# Patient Record
Sex: MALE | Race: WHITE
[De-identification: names, ages, dates, MRNs, and addresses within clinical notes are randomized per-mention and may not be internally consistent; named-entity substitution may affect disease eponyms.]

---

## 2020-08-02 ENCOUNTER — HOSPITAL ENCOUNTER (EMERGENCY)
Dept: HOSPITAL 62 - ER | Age: 80
Discharge: HOME | End: 2020-08-02
Payer: MEDICARE

## 2020-08-02 VITALS — DIASTOLIC BLOOD PRESSURE: 71 MMHG | SYSTOLIC BLOOD PRESSURE: 135 MMHG

## 2020-08-02 DIAGNOSIS — R06.02: ICD-10-CM

## 2020-08-02 DIAGNOSIS — R50.9: Primary | ICD-10-CM

## 2020-08-02 DIAGNOSIS — I10: ICD-10-CM

## 2020-08-02 DIAGNOSIS — R53.1: ICD-10-CM

## 2020-08-02 DIAGNOSIS — R42: ICD-10-CM

## 2020-08-02 DIAGNOSIS — Z20.828: ICD-10-CM

## 2020-08-02 DIAGNOSIS — R53.83: ICD-10-CM

## 2020-08-02 LAB
A TYPE INFLUENZA AG: NEGATIVE
ABSOLUTE LYMPHOCYTES# (MANUAL): 1.1 10^3/UL (ref 0.5–4.7)
ABSOLUTE MONOCYTES # (MANUAL): 1.8 10^3/UL (ref 0.1–1.4)
ADD MANUAL DIFF: YES
ALBUMIN SERPL-MCNC: 4.7 G/DL (ref 3.5–5)
ALP SERPL-CCNC: 60 U/L (ref 38–126)
ANION GAP SERPL CALC-SCNC: 8 MMOL/L (ref 5–19)
APPEARANCE UR: (no result)
APTT PPP: (no result) S
AST SERPL-CCNC: 33 U/L (ref 17–59)
B INFLUENZA AG: NEGATIVE
BASOPHILS NFR BLD MANUAL: 0 % (ref 0–2)
BILIRUB DIRECT SERPL-MCNC: 0.1 MG/DL (ref 0–0.4)
BILIRUB SERPL-MCNC: 1.1 MG/DL (ref 0.2–1.3)
BILIRUB UR QL STRIP: NEGATIVE
BUN SERPL-MCNC: 20 MG/DL (ref 7–20)
CALCIUM: 9.7 MG/DL (ref 8.4–10.2)
CHLORIDE SERPL-SCNC: 99 MMOL/L (ref 98–107)
CO2 SERPL-SCNC: 28 MMOL/L (ref 22–30)
EOSINOPHIL NFR BLD MANUAL: 0 % (ref 0–6)
ERYTHROCYTE [DISTWIDTH] IN BLOOD BY AUTOMATED COUNT: 13.5 % (ref 11.5–14)
GLUCOSE SERPL-MCNC: 116 MG/DL (ref 75–110)
GLUCOSE UR STRIP-MCNC: NEGATIVE MG/DL
HCT VFR BLD CALC: 48.2 % (ref 37.9–51)
HGB BLD-MCNC: 16.9 G/DL (ref 13.5–17)
INR PPP: 0.97
KETONES UR STRIP-MCNC: (no result) MG/DL
MCH RBC QN AUTO: 31.7 PG (ref 27–33.4)
MCHC RBC AUTO-ENTMCNC: 35.1 G/DL (ref 32–36)
MCV RBC AUTO: 90 FL (ref 80–97)
MONOCYTES % (MANUAL): 12 % (ref 3–13)
PH UR STRIP: 5 [PH] (ref 5–9)
PLATELET # BLD: 164 10^3/UL (ref 150–450)
PLATELET COMMENT: ADEQUATE
POTASSIUM SERPL-SCNC: 4.4 MMOL/L (ref 3.6–5)
PROT SERPL-MCNC: 8 G/DL (ref 6.3–8.2)
PROT UR STRIP-MCNC: 30 MG/DL
PROTHROMBIN TIME: 13.1 SEC (ref 11.4–15.4)
RBC # BLD AUTO: 5.33 10^6/UL (ref 4.35–5.55)
RBC MORPH BLD: (no result)
SEGMENTED NEUTROPHILS % (MAN): 81 % (ref 42–78)
SP GR UR STRIP: 1.03
TOTAL CELLS COUNTED BLD: 100
UROBILINOGEN UR-MCNC: 2 MG/DL (ref ?–2)
VARIANT LYMPHS NFR BLD MANUAL: 7 % (ref 13–45)
WBC # BLD AUTO: 15 10^3/UL (ref 4–10.5)

## 2020-08-02 PROCEDURE — 71045 X-RAY EXAM CHEST 1 VIEW: CPT

## 2020-08-02 PROCEDURE — 85610 PROTHROMBIN TIME: CPT

## 2020-08-02 PROCEDURE — 87040 BLOOD CULTURE FOR BACTERIA: CPT

## 2020-08-02 PROCEDURE — 83605 ASSAY OF LACTIC ACID: CPT

## 2020-08-02 PROCEDURE — 36415 COLL VENOUS BLD VENIPUNCTURE: CPT

## 2020-08-02 PROCEDURE — 84484 ASSAY OF TROPONIN QUANT: CPT

## 2020-08-02 PROCEDURE — 80053 COMPREHEN METABOLIC PANEL: CPT

## 2020-08-02 PROCEDURE — 96360 HYDRATION IV INFUSION INIT: CPT

## 2020-08-02 PROCEDURE — 99284 EMERGENCY DEPT VISIT MOD MDM: CPT

## 2020-08-02 PROCEDURE — 93005 ELECTROCARDIOGRAM TRACING: CPT

## 2020-08-02 PROCEDURE — 93010 ELECTROCARDIOGRAM REPORT: CPT

## 2020-08-02 PROCEDURE — 87804 INFLUENZA ASSAY W/OPTIC: CPT

## 2020-08-02 PROCEDURE — 85025 COMPLETE CBC W/AUTO DIFF WBC: CPT

## 2020-08-02 PROCEDURE — C9803 HOPD COVID-19 SPEC COLLECT: HCPCS

## 2020-08-02 PROCEDURE — 87635 SARS-COV-2 COVID-19 AMP PRB: CPT

## 2020-08-02 PROCEDURE — 81001 URINALYSIS AUTO W/SCOPE: CPT

## 2020-08-02 NOTE — ER DOCUMENT REPORT
ED Fever





- General


Chief Complaint: Fever


Stated Complaint: WEAKNESS


Time Seen by Provider: 08/02/20 14:02


Mode of Arrival: Wheelchair


Information source: Patient


Notes: 





Patient is a 79-year-old male comes emergency room complaining of fatigue.  

Patient states he started feeling bad approximately 3 weeks ago he had talked 

his primary care provider that he had had increasing fatigue along with mild 

dizziness.  He states that the doctor told him that it could be his blood 

pressure medication causing it.  Patient states is gotten worse over the last 

few days.  He states he is noticed these not being on the walk on his treadmill 

and he thinks is more from being tired than being short of breath but has a 

little shortness of breath.  Patient denies having any fever he did not know 

until he arrived in the emergency room today had a temp of 102.0.  He only has a

past medical history pertinent for hypertension and hypothyroidism.  He denies 

any history of cardiac disease.  Patient does not smoke.  He lives at home with 

a member of the [employer] that helps take care of him.  He has had no sick 

contacts.  He has had no contact with anyone he knows of that has had the 

coronavirus.


TRAVEL OUTSIDE OF THE [place] IN LAST 30 DAYS: No





- HPI


Onset: Other - 3 weeks worse past 3 days


Onset/Duration: Gradual


Quality of pain: Achy


Severity: None


Pain Level: Denies


Context: denies: Cough


Associated symptoms: Fever, Weakness


Similar symptoms previously: Yes


Recently seen / treated by doctor: Yes





- Related Data


Allergies/Adverse Reactions: 


                                        





No Known Allergies Allergy (Verified 08/02/20 12:59)


   











Past Medical History





- General


Information source: Patient





- Social History


Smoking Status: Never Smoker


Chew tobacco use (# tins/day): No


Frequency of alcohol use: None


Drug Abuse: None


Lives with: Friend


Family History: Reviewed & Not Pertinent





- Past Medical History


Cardiac Medical History: Reports: Hx Hypercholesterolemia, Hx Hypertension





Review of Systems





- Review of Systems


Constitutional: See HPI, Fever, Weakness


EENT: No symptoms reported


Cardiovascular: No symptoms reported


Respiratory: No symptoms reported


Gastrointestinal: No symptoms reported


Genitourinary: No symptoms reported


Male Genitourinary: No symptoms reported


Musculoskeletal: No symptoms reported


Skin: No symptoms reported


Hematologic/Lymphatic: No symptoms reported


Neurological/Psychological: No symptoms reported


-: Yes All other systems reviewed and negative





Physical Exam





- Vital signs


Vitals: 


                                        











Temp Pulse Resp BP Pulse Ox


 


 102.9 F H  89   16   142/71 H  98 


 


 08/02/20 11:58  08/02/20 11:58  08/02/20 11:58  08/02/20 11:58  08/02/20 11:58











Interpretation: Hypotensive





- Notes


Notes: 





PHYSICAL EXAMINATION:





GENERAL: Patient is a frail-appearing 79-year-old male who is in no apparent 

distress on examination this afternoon.  Patient answers questions appropriately

he just appears  tired.





HEAD: Atraumatic, normocephalic.





EYES: Pupils equal round and reactive to light, extraocular movements intact, 

sclera anicteric, conjunctiva are normal.





ENT: Examination head and upper airway showed nasal mucosa be mildly 

erythematous and edematous no rhinorrhea noted.  No frontal or maxillary sinus 

tenderness to palpation.  Bilateral TMs normal no retraction or bulging.  

Posterior pharynx shows some mild erythema but no exudates uvula is midline 

erythema no exudate and airways patent.





NECK: Normal range of motion, supple without lymphadenopathy





LUNGS: Auscultation patient's lungs show bilateral breath sounds with breath 

sounds decreased throughout but no rhonchi rales or wheeze are heard on 

auscultation.


HEART: Regular rate and rhythm without murmurs





ABDOMEN: Soft, nontender, nondistended abdomen.  No guarding, no rebound.  No 

masses appreciated.





Musculoskeletal: Normal range of motion, no pitting or edema.  No cyanosis.





NEUROLOGICAL:   Normal speech, normal gait.  Normal sensory, motor exams 





PSYCH: Normal mood, normal affect.





SKIN: Warm, Dry, normal turgor, no rashes or lesions noted.





Course





- Re-evaluation


Re-evalutation: 





08/02/20 16:15


Original concern for probable patient was due to be admitted however we 

ambulated patient and his saturations never dropped below 96.  We gave him a 

half a liter of fluids and he perked up.  And he feels like he is ready to home.

 Given that his labs have all come back relatively normal with the exception of 

a white count of 15,000 I am going to go ahead and send him home on some 

Zithromax.  You know his chest x-ray was negative for any acute findings I feel 

at 71 years old justifiable that he if this is an early pneumonia and has not 

been picked up at this time that the Zithromax may actually improve his outcome.

 I have discussed with the patient in depth that he needs to go home self 

quarantine until such time as he gets a negative test result.  Of also informed 

him since he does have a houseguest that they will need to be wearing mask and 

social distance must be observed.  Have highly recommended him that he get a 

thermometer if he does not have 1 and alternate Tylenol and Motrin every 4 hours

to keep his fever down.  I have also highly explained to him the need to keep 

hydrated.  He also understands that should he not be able to control the fever 

or he gets short of breath he is to return to ER for reevaluation.





- Vital Signs


Vital signs: 


                                        











Temp Pulse Resp BP Pulse Ox


 


 99.1 F   89   32 H  146/74 H  95 


 


 08/02/20 14:37  08/02/20 11:58  08/02/20 12:14  08/02/20 12:13  08/02/20 12:22














- Laboratory


Result Diagrams: 


                                 08/02/20 12:05





                                 08/02/20 12:05


Laboratory results interpreted by me: 


                                        











  08/02/20 08/02/20 08/02/20





  12:05 12:05 12:05


 


WBC  15.0 H  


 


Seg Neuts % (Manual)  81 H  


 


Lymphocytes % (Manual)  7 L  


 


Abs Neuts (Manual)  12.2 H  


 


Abs Monocytes (Manual)  1.8 H  


 


Sodium   135.3 L 


 


Glucose   116 H 


 


Urine Protein    30 H


 


Urine Ketones    TRACE H


 


Urine Urobilinogen    2.0 H


 


Urine Ascorbic Acid    40 H














Discharge





- Discharge


Clinical Impression: 


Fever


Qualifiers:


 Fever type: unspecified Qualified Code(s): R50.9 - Fever, unspecified





Condition: Stable


Disposition: HOME, SELF-CARE


Instructions:  Acetaminophen, Viral Syndrome ([facility]), Ibuprofen (General) ([facility])


Additional Instructions: 


As we discussed is highly important at this time that you go home and treat 

yourself as if you have the coronavirus.  The reason is you have spiked a fever 

with no known cause.  So the best thing for you and people surrounding you is to

stay  and to wear a mask.  Since you live with someone in your house I 

highly recommend that both of you wear a mask at all times and that you keep 

social distancing norms of at least 6 foot.  You should alternate Tylenol and 

Motrin about every 4 hours to keep your fever down.  You should drink fluids to 

keep yourself hydrated to include water juice tea Gatorade.  And you should 

contact your primary care provider tomorrow for her to set up an appointment for

continuation of care.  Should you spike a fever or have any concerns or should 

get short of breath return to ER for reevaluation.


Prescriptions: 


Azithromycin [Zithromax 250 mg Tablet] 250 mg PO ASDIR PRN #6 tablet


 PRN Reason: 


Referrals: 


[facility] [Provider Group] - Follow up as needed

## 2020-08-02 NOTE — RADIOLOGY REPORT (SQ)
EXAM DESCRIPTION:  CHEST SINGLE VIEW



IMAGES COMPLETED DATE/TIME:  8/2/2020 12:40 pm



REASON FOR STUDY:  weakness



COMPARISON:  None.



EXAM PARAMETERS:  NUMBER OF VIEWS: One view.

TECHNIQUE: Single frontal radiographic view of the chest acquired.

RADIATION DOSE: NA

LIMITATIONS: None.



FINDINGS:  LUNGS AND PLEURA: No opacities, masses or pneumothorax. No pleural effusion.

MEDIASTINUM AND HILAR STRUCTURES: No masses.  Contour normal.

HEART AND VASCULAR STRUCTURES: Heart normal in size.  Normal vasculature.

BONES: No acute findings.

HARDWARE: None in the chest.

OTHER: No other significant finding.



IMPRESSION:  NO ACUTE RADIOGRAPHIC FINDING IN THE CHEST.



TECHNICAL DOCUMENTATION:  JOB ID:  [id number]

 2011 [employer]- All Rights Reserved



Reading location - IP/workstation name: [email or web address]

## 2020-08-04 ENCOUNTER — HOSPITAL ENCOUNTER (OUTPATIENT)
Dept: HOSPITAL 62 - ER | Age: 80
Setting detail: OBSERVATION
LOS: 2 days | Discharge: HOME | End: 2020-08-06
Attending: NURSE PRACTITIONER | Admitting: FAMILY MEDICINE
Payer: MEDICARE

## 2020-08-04 DIAGNOSIS — R73.9: ICD-10-CM

## 2020-08-04 DIAGNOSIS — I10: ICD-10-CM

## 2020-08-04 DIAGNOSIS — R79.89: ICD-10-CM

## 2020-08-04 DIAGNOSIS — R53.1: Primary | ICD-10-CM

## 2020-08-04 DIAGNOSIS — E78.5: ICD-10-CM

## 2020-08-04 DIAGNOSIS — E03.9: ICD-10-CM

## 2020-08-04 DIAGNOSIS — E87.1: ICD-10-CM

## 2020-08-04 DIAGNOSIS — Z88.4: ICD-10-CM

## 2020-08-04 DIAGNOSIS — Z87.891: ICD-10-CM

## 2020-08-04 DIAGNOSIS — Z79.899: ICD-10-CM

## 2020-08-04 DIAGNOSIS — D69.6: ICD-10-CM

## 2020-08-04 LAB
ADD MANUAL DIFF: NO
ALBUMIN SERPL-MCNC: 3.3 G/DL (ref 3.5–5)
ALP SERPL-CCNC: 69 U/L (ref 38–126)
ANION GAP SERPL CALC-SCNC: 6 MMOL/L (ref 5–19)
APPEARANCE UR: (no result)
APTT PPP: YELLOW S
AST SERPL-CCNC: 90 U/L (ref 17–59)
BASOPHILS # BLD AUTO: 0 10^3/UL (ref 0–0.2)
BASOPHILS NFR BLD AUTO: 0.5 % (ref 0–2)
BILIRUB DIRECT SERPL-MCNC: 0 MG/DL (ref 0–0.4)
BILIRUB SERPL-MCNC: 0.5 MG/DL (ref 0.2–1.3)
BILIRUB UR QL STRIP: NEGATIVE
BUN SERPL-MCNC: 18 MG/DL (ref 7–20)
CALCIUM: 8.4 MG/DL (ref 8.4–10.2)
CHLORIDE SERPL-SCNC: 103 MMOL/L (ref 98–107)
CK MB SERPL-MCNC: 1.86 NG/ML (ref ?–4.55)
CK SERPL-CCNC: 249 U/L (ref 55–170)
CO2 SERPL-SCNC: 27 MMOL/L (ref 22–30)
EOSINOPHIL # BLD AUTO: 0.2 10^3/UL (ref 0–0.6)
EOSINOPHIL NFR BLD AUTO: 2.3 % (ref 0–6)
ERYTHROCYTE [DISTWIDTH] IN BLOOD BY AUTOMATED COUNT: 13.9 % (ref 11.5–14)
GLUCOSE SERPL-MCNC: 96 MG/DL (ref 75–110)
GLUCOSE UR STRIP-MCNC: NEGATIVE MG/DL
HCT VFR BLD CALC: 43.6 % (ref 37.9–51)
HGB BLD-MCNC: 15.2 G/DL (ref 13.5–17)
INR PPP: 0.99
KETONES UR STRIP-MCNC: NEGATIVE MG/DL
LYMPHOCYTES # BLD AUTO: 0.7 10^3/UL (ref 0.5–4.7)
LYMPHOCYTES NFR BLD AUTO: 9.6 % (ref 13–45)
MCH RBC QN AUTO: 31.3 PG (ref 27–33.4)
MCHC RBC AUTO-ENTMCNC: 35 G/DL (ref 32–36)
MCV RBC AUTO: 90 FL (ref 80–97)
MONOCYTES # BLD AUTO: 0.5 10^3/UL (ref 0.1–1.4)
MONOCYTES NFR BLD AUTO: 7.6 % (ref 3–13)
NEUTROPHILS # BLD AUTO: 5.6 10^3/UL (ref 1.7–8.2)
NEUTS SEG NFR BLD AUTO: 80 % (ref 42–78)
NITRITE UR QL STRIP: NEGATIVE
NT PRO BNP: 746 PG/ML (ref ?–450)
PH UR STRIP: 5 [PH] (ref 5–9)
PLATELET # BLD: 128 10^3/UL (ref 150–450)
POTASSIUM SERPL-SCNC: 3.8 MMOL/L (ref 3.6–5)
PROT SERPL-MCNC: 6.1 G/DL (ref 6.3–8.2)
PROT UR STRIP-MCNC: 100 MG/DL
PROTHROMBIN TIME: 13.3 SEC (ref 11.4–15.4)
RBC # BLD AUTO: 4.86 10^6/UL (ref 4.35–5.55)
SP GR UR STRIP: 1.02
TOTAL CELLS COUNTED % (AUTO): 100 %
TROPONIN I SERPL-MCNC: 0.04 NG/ML
UROBILINOGEN UR-MCNC: NEGATIVE MG/DL (ref ?–2)
WBC # BLD AUTO: 7 10^3/UL (ref 4–10.5)

## 2020-08-04 PROCEDURE — 84443 ASSAY THYROID STIM HORMONE: CPT

## 2020-08-04 PROCEDURE — 99285 EMERGENCY DEPT VISIT HI MDM: CPT

## 2020-08-04 PROCEDURE — 83735 ASSAY OF MAGNESIUM: CPT

## 2020-08-04 PROCEDURE — 36415 COLL VENOUS BLD VENIPUNCTURE: CPT

## 2020-08-04 PROCEDURE — 82553 CREATINE MB FRACTION: CPT

## 2020-08-04 PROCEDURE — 85610 PROTHROMBIN TIME: CPT

## 2020-08-04 PROCEDURE — 85027 COMPLETE CBC AUTOMATED: CPT

## 2020-08-04 PROCEDURE — 80061 LIPID PANEL: CPT

## 2020-08-04 PROCEDURE — 96360 HYDRATION IV INFUSION INIT: CPT

## 2020-08-04 PROCEDURE — 83880 ASSAY OF NATRIURETIC PEPTIDE: CPT

## 2020-08-04 PROCEDURE — G0378 HOSPITAL OBSERVATION PER HR: HCPCS

## 2020-08-04 PROCEDURE — 84484 ASSAY OF TROPONIN QUANT: CPT

## 2020-08-04 PROCEDURE — 85025 COMPLETE CBC W/AUTO DIFF WBC: CPT

## 2020-08-04 PROCEDURE — 82550 ASSAY OF CK (CPK): CPT

## 2020-08-04 PROCEDURE — 71045 X-RAY EXAM CHEST 1 VIEW: CPT

## 2020-08-04 PROCEDURE — 81001 URINALYSIS AUTO W/SCOPE: CPT

## 2020-08-04 PROCEDURE — 87086 URINE CULTURE/COLONY COUNT: CPT

## 2020-08-04 PROCEDURE — 80076 HEPATIC FUNCTION PANEL: CPT

## 2020-08-04 PROCEDURE — 80053 COMPREHEN METABOLIC PANEL: CPT

## 2020-08-04 PROCEDURE — 84481 FREE ASSAY (FT-3): CPT

## 2020-08-04 PROCEDURE — 80048 BASIC METABOLIC PNL TOTAL CA: CPT

## 2020-08-04 PROCEDURE — 93306 TTE W/DOPPLER COMPLETE: CPT

## 2020-08-04 PROCEDURE — 93017 CV STRESS TEST TRACING ONLY: CPT

## 2020-08-04 NOTE — RADIOLOGY REPORT (SQ)
EXAM DESCRIPTION:  Chest x-ray



Completed date and time 8/2/2020 12:40 PM



CLINICAL HISTORY:  79 years  Male  Fatigue



COMPARISON:  8/2/2020



FINDINGS: 



The cardiomediastinal silhouette appears unremarkable.

No consolidating infiltrates or pleural effusions.

No pneumothorax. 







IMPRESSION: 



No acute abnormality is identified.

## 2020-08-05 LAB
ALBUMIN SERPL-MCNC: 2.6 G/DL (ref 3.5–5)
ALP SERPL-CCNC: 56 U/L (ref 38–126)
ANION GAP SERPL CALC-SCNC: 7 MMOL/L (ref 5–19)
AST SERPL-CCNC: 75 U/L (ref 17–59)
BILIRUB DIRECT SERPL-MCNC: 0 MG/DL (ref 0–0.4)
BILIRUB SERPL-MCNC: 0.4 MG/DL (ref 0.2–1.3)
BUN SERPL-MCNC: 13 MG/DL (ref 7–20)
CALCIUM: 7.8 MG/DL (ref 8.4–10.2)
CHLORIDE SERPL-SCNC: 103 MMOL/L (ref 98–107)
CHOLEST SERPL-MCNC: 97.76 MG/DL (ref 0–200)
CK MB SERPL-MCNC: 1.08 NG/ML (ref ?–4.55)
CK MB SERPL-MCNC: 1.12 NG/ML (ref ?–4.55)
CK MB SERPL-MCNC: 1.67 NG/ML (ref ?–4.55)
CK SERPL-CCNC: 168 U/L (ref 55–170)
CO2 SERPL-SCNC: 24 MMOL/L (ref 22–30)
ERYTHROCYTE [DISTWIDTH] IN BLOOD BY AUTOMATED COUNT: 13.9 % (ref 11.5–14)
GLUCOSE SERPL-MCNC: 135 MG/DL (ref 75–110)
HCT VFR BLD CALC: 38.9 % (ref 37.9–51)
HGB BLD-MCNC: 13.6 G/DL (ref 13.5–17)
LDLC SERPL DIRECT ASSAY-MCNC: < 30 MG/DL (ref ?–100)
MCH RBC QN AUTO: 31.3 PG (ref 27–33.4)
MCHC RBC AUTO-ENTMCNC: 35 G/DL (ref 32–36)
MCV RBC AUTO: 89 FL (ref 80–97)
PLATELET # BLD: 133 10^3/UL (ref 150–450)
POTASSIUM SERPL-SCNC: 3.7 MMOL/L (ref 3.6–5)
PROT SERPL-MCNC: 5.3 G/DL (ref 6.3–8.2)
RBC # BLD AUTO: 4.35 10^6/UL (ref 4.35–5.55)
T3FREE SERPL-MCNC: 2.42 PG/ML (ref 2.77–5.27)
TRIGL SERPL-MCNC: 311 MG/DL (ref ?–150)
TROPONIN I SERPL-MCNC: 0.06 NG/ML
TSH SERPL-ACNC: 2.49 UIU/ML (ref 0.47–4.68)
VLDLC SERPL CALC-MCNC: 62.2 MG/DL (ref 10–31)
WBC # BLD AUTO: 5.1 10^3/UL (ref 4–10.5)

## 2020-08-05 RX ADMIN — Medication SCH: at 21:58

## 2020-08-05 RX ADMIN — FAMOTIDINE SCH: 20 TABLET, FILM COATED ORAL at 21:58

## 2020-08-05 RX ADMIN — HEPARIN SODIUM SCH: 5000 INJECTION, SOLUTION INTRAVENOUS; SUBCUTANEOUS at 06:32

## 2020-08-05 RX ADMIN — FAMOTIDINE SCH: 20 TABLET, FILM COATED ORAL at 10:36

## 2020-08-05 RX ADMIN — HEPARIN SODIUM SCH: 5000 INJECTION, SOLUTION INTRAVENOUS; SUBCUTANEOUS at 21:58

## 2020-08-05 RX ADMIN — HEPARIN SODIUM SCH: 5000 INJECTION, SOLUTION INTRAVENOUS; SUBCUTANEOUS at 13:51

## 2020-08-05 RX ADMIN — Medication SCH ML: at 06:24

## 2020-08-05 RX ADMIN — METOPROLOL SUCCINATE SCH MG: 50 TABLET, EXTENDED RELEASE ORAL at 10:32

## 2020-08-05 RX ADMIN — Medication SCH: at 13:35

## 2020-08-05 RX ADMIN — DOCUSATE SODIUM SCH: 100 CAPSULE, LIQUID FILLED ORAL at 17:09

## 2020-08-05 RX ADMIN — DOCUSATE SODIUM SCH: 100 CAPSULE, LIQUID FILLED ORAL at 10:36

## 2020-08-05 NOTE — XCELERA REPORT
[address] [address]

                               Tel: [phone]

                               Fax: [phone]



                      Transthoracic Echocardiogram Report

_______________________________________________________________________________



Name: LUCIANO JACKSON

MRN: [id number]                           Age: 79 yrs

Gender: Male                              : 1940

Patient Status: Inpatient                 Patient Location: [address] [place] [address]

Account #: [id number]

Study Date: 2020 05:59 PM

Accession #: [id number]

_______________________________________________________________________________



Height: 69 in        Weight: 159 lb        BSA: 1.9 m2

_______________________________________________________________________________

Procedure: A complete two-dimensional transthoracic echocardiogram was

performed (2D, M-mode, spectral and color flow Doppler). The study was

technically adequate with some images being suboptimal in quality. Images from

the parasternal window were difficult to obtain and are suboptimal in quality.

Reason For Study: Elevated troponin





Ordering Physician: GUNDERSEN, JAMES

Performed By: Sofia Lu



_______________________________________________________________________________



Interpretation Summary

The left ventricle is grossly normal size.

There is mild concentric left ventricular hypertrophy.

Left ventricular systolic function is normal.

The Ejection Fraction estimate is 65-70%.

Doppler measurements suggest normal left ventricular diastolic function.

The left ventricular wall motion is normal.

Mikd LAE.

Trace MR, trace TR.

No prior studies for comparison.



MMode/2D Measurements & Calculations

RVDd: 3.4 cm      LVIDd: 4.9 cm       FS: 38.2 %         Ao root diam: 2.6 cm

IVSd: 1.1 cm      LVIDs: 3.0 cm       EDV(Teich):        Ao root area:

                                      112.0 ml

                  LVPWd: 1.2 cm                          5.3 cm2

                                      ESV(Teich): 35.6 mlLA dimension: 4.2 cm

                                      EF(Teich): 68.2 %

        _______________________________________________________________

LVOT diam: 2.1 cm LVLd ap4: 7.5 cm    SV(MOD-sp4):

LVOT area:        EDV(MOD-sp4):       47.0 ml

3.3 cm2           67.0 ml

                  LVLs ap4: 5.4 cm

                  ESV(MOD-sp4):

                  20.0 ml

                  EF(MOD-sp4): 70.1 %



Doppler Measurements & Calculations

MV E max francisco:       MV P1/2t max francisco:    Ao V2 max:       AI max francisco:

91.3 cm/sec         99.1 cm/sec          191.4 cm/sec     235.5 cm/sec

MV A max francisco:       MV P1/2t: 98.2 msec  Ao max PG:       AI max P.2 mmHg

86.9 cm/sec                              14.7 mmHg

                    MVA(P1/2t): 2.2 cm2                   AI dec slope:

MV E/A: 1.1         MV dec slope:        GIL(V,D): 2.6 or2843.7 cm/sec2

                    295.6 cm/sec2                         AI P1/2t: 548.7 msec

                    MV dec time: 0.19 sec



        _______________________________________________________________

LV V1 max PG:       SV(LVOT): 90.7 ml    PA V2 max:       TR max francisco:

8.6 mmHg                                 109.6 cm/sec     238.5 cm/sec

LV V1 mean PG:                           PA max PG:       TR max P.8 mmHg

4.0 mmHg                                 4.8 mmHg

LV V1 max:

146.6 cm/sec

LV V1 mean:

84.1 cm/sec

LV V1 VTI: 27.2 cm

        _______________________________________________________________



AV P1/2t-pr_phl:    MV P1/2t-pr_phl:

548.7 msec          98.2 msec



Left Ventricle

The left ventricle is grossly normal size. There is mild concentric left

ventricular hypertrophy. Left ventricular systolic function is normal. The

Ejection Fraction estimate is 65-70%. Doppler measurements suggest normal left

ventricular diastolic function. The left ventricular wall motion is normal.



Right Ventricle

The right ventricle is normal in size, thickness and function. The right

ventricular systolic function is normal.



Atria

The right atrium is normal. The left atrium is mildly dilated.



Mitral Valve

There is mild mitral annular calcification. Mitral valve prolapse cannot be

excluded. There is no mitral valve stenosis. There is a trace amount of mitral

regurgitation.





Aortic Valve

The aortic valve is moderately calcified. There is no aortic valvular

vegetation. There is aortic sclerosis without aortic stenosis. No

hemodynamically significant valvular aortic stenosis. There is a trace to mild

amount of aortic regurgitation.



Tricuspid Valve

The tricuspid valve is not well visualized, but is grossly normal. There is no

tricuspid valve prolapse. There is no tricuspid stenosis. There is a trace or

physiologic amount of tricuspid regurgitation.



Pulmonic Valve

The pulmonic valve is not well seen, but is grossly normal. There is no

vegetation on the pulmonic valve. There is no pulmonic valvular stenosis.

There is no pulmonic valvular regurgitation.



Effusions

There is no pericardial effusion. There is no pleural effusion.





_______________________________________________________________________________

_______________________________________________________________________________



Electronically signed by:      Manuel Donato      on 2020 07:18 PM



CC: GUNDERSEN, JAMES Rodriguez, Antonio

## 2020-08-05 NOTE — PDOC PROGRESS REPORT
Subjective


Progress Note for:: 08/05/20


Subjective:: 


Denies chest pain/chest pressure/shortness of breath.  Complains of continued 

generalized weakness


Reason For Visit: 


ELEVATED TROPONIN,ELEVATED BNP,GENERALIZED WEAKNES








Physical Exam


Vital Signs: 


                                        











Temp Pulse Resp BP Pulse Ox


 


 100.7 F H  77   20   122/68   96 


 


 08/05/20 12:19  08/05/20 12:21  08/05/20 12:19  08/05/20 12:21  08/05/20 12:21








                                 Intake & Output











 08/04/20 08/05/20 08/06/20





 06:59 06:59 06:59


 


Intake Total  1240 240


 


Output Total   150


 


Balance  1240 90


 


Weight  72.4 kg 











General appearance: PRESENT: no acute distress, cooperative, well-developed, 

well-nourished


Head exam: PRESENT: atraumatic, normocephalic


Eye exam: PRESENT: conjunctiva pink


Mouth exam: PRESENT: moist, tongue midline


Neck exam: ABSENT: JVD


Respiratory exam: PRESENT: clear to auscultation rigoberto, symmetrical, unlabored.  

ABSENT: accessory muscle use


Cardiovascular exam: PRESENT: RRR, +S1, +S2


Pulses: PRESENT: normal carotid pulses, normal radial pulses


Vascular exam: PRESENT: normal capillary refill


GI/Abdominal exam: PRESENT: normal bowel sounds, soft.  ABSENT: distended, 

tenderness


Rectal exam: PRESENT: deferred


Extremities exam: ABSENT: pedal edema


Musculoskeletal exam: PRESENT: ambulatory


Neurological exam: PRESENT: alert, awake, oriented to person, oriented to place,

oriented to time, oriented to situation, CN II-XII grossly intact


Psychiatric exam: PRESENT: appropriate affect, normal mood.  ABSENT: agitated, 

anxious


Skin exam: PRESENT: dry, normal color, warm





Results


Laboratory Results: 


                                        





                                 08/05/20 07:30 





                                 08/05/20 07:30 





                                        











  08/04/20 08/04/20 08/04/20





  20:35 20:35 20:35


 


WBC  Cancelled  


 


RBC  Cancelled  


 


Hgb  Cancelled  


 


Hct  Cancelled  


 


MCV  Cancelled  


 


MCH  Cancelled  


 


MCHC  Cancelled  


 


RDW  Cancelled  


 


Plt Count  Cancelled  


 


Seg Neutrophils %  Cancelled  


 


Sodium   Cancelled 


 


Potassium   Cancelled 


 


Chloride   Cancelled 


 


Carbon Dioxide   Cancelled 


 


Anion Gap   Cancelled 


 


BUN   Cancelled 


 


Creatinine   Cancelled 


 


Est GFR ( Amer)   Cancelled 


 


Est GFR (Non-Af Amer)   Cancelled 


 


Glucose   Cancelled 


 


Calcium   Cancelled 


 


Magnesium   


 


Total Bilirubin   Cancelled 


 


AST   Cancelled 


 


Alkaline Phosphatase   Cancelled 


 


Total Protein   Cancelled 


 


Albumin   Cancelled 


 


Triglycerides   


 


Cholesterol   


 


LDL Cholesterol Direct   


 


VLDL Cholesterol   


 


HDL Cholesterol   


 


TSH   


 


Free T3 pg/mL   


 


Urine Color    YELLOW


 


Urine Appearance    SLIGHTLY-CLOUDY


 


Urine pH    5.0


 


Ur Specific Gravity    1.021


 


Urine Protein    100 H


 


Urine Glucose (UA)    NEGATIVE


 


Urine Ketones    NEGATIVE


 


Urine Blood    NEGATIVE


 


Urine Nitrite    NEGATIVE


 


Ur Leukocyte Esterase    NEGATIVE


 


Urine WBC (Auto)    4


 


Urine RBC (Auto)    6














  08/04/20 08/04/20 08/05/20





  22:33 22:33 07:30


 


WBC  7.0   5.1


 


RBC  4.86   4.35


 


Hgb  15.2   13.6


 


Hct  43.6   38.9


 


MCV  90   89


 


MCH  31.3   31.3


 


MCHC  35.0   35.0


 


RDW  13.9   13.9


 


Plt Count  128 L   133 L


 


Seg Neutrophils %  80.0 H  


 


Sodium   135.6 L 


 


Potassium   3.8 


 


Chloride   103 


 


Carbon Dioxide   27 


 


Anion Gap   6 


 


BUN   18 


 


Creatinine   0.82 


 


Est GFR ( Amer)   > 60 


 


Est GFR (Non-Af Amer)   


 


Glucose   96 


 


Calcium   8.4 


 


Magnesium   


 


Total Bilirubin   0.5 


 


AST   90 H 


 


Alkaline Phosphatase   69 


 


Total Protein   6.1 L 


 


Albumin   3.3 L 


 


Triglycerides   


 


Cholesterol   


 


LDL Cholesterol Direct   


 


VLDL Cholesterol   


 


HDL Cholesterol   


 


TSH   


 


Free T3 pg/mL   


 


Urine Color   


 


Urine Appearance   


 


Urine pH   


 


Ur Specific Gravity   


 


Urine Protein   


 


Urine Glucose (UA)   


 


Urine Ketones   


 


Urine Blood   


 


Urine Nitrite   


 


Ur Leukocyte Esterase   


 


Urine WBC (Auto)   


 


Urine RBC (Auto)   














  08/05/20 08/05/20





  07:30 07:30


 


WBC  


 


RBC  


 


Hgb  


 


Hct  


 


MCV  


 


MCH  


 


MCHC  


 


RDW  


 


Plt Count  


 


Seg Neutrophils %  


 


Sodium  133.8 L 


 


Potassium  3.7 


 


Chloride  103 


 


Carbon Dioxide  24 


 


Anion Gap  7 


 


BUN  13 


 


Creatinine  0.64 


 


Est GFR ( Amer)  > 60 


 


Est GFR (Non-Af Amer)  


 


Glucose  135 H 


 


Calcium  7.8 L 


 


Magnesium  1.8 


 


Total Bilirubin  0.4 


 


AST  75 H 


 


Alkaline Phosphatase  56 


 


Total Protein  5.3 L 


 


Albumin  2.6 L 


 


Triglycerides  311 H 


 


Cholesterol  97.76 


 


LDL Cholesterol Direct  < 30 


 


VLDL Cholesterol  62.2 H 


 


HDL Cholesterol  8 L 


 


TSH   2.49


 


Free T3 pg/mL   2.42 L


 


Urine Color  


 


Urine Appearance  


 


Urine pH  


 


Ur Specific Gravity  


 


Urine Protein  


 


Urine Glucose (UA)  


 


Urine Ketones  


 


Urine Blood  


 


Urine Nitrite  


 


Ur Leukocyte Esterase  


 


Urine WBC (Auto)  


 


Urine RBC (Auto)  








                                        











  08/04/20 08/04/20 08/04/20





  20:35 20:35 22:33


 


Creatine Kinase  Cancelled   249 H


 


CK-MB (CK-2)   1.86 


 


Troponin I   0.039 


 


NT-Pro-B Natriuret Pep   746 H 














  08/05/20 08/05/20 08/05/20





  01:12 07:30 07:30


 


Creatine Kinase   Cancelled 


 


CK-MB (CK-2)    1.67


 


Troponin I  0.045   0.055


 


NT-Pro-B Natriuret Pep   














  08/05/20





  07:30


 


Creatine Kinase  168


 


CK-MB (CK-2) 


 


Troponin I 


 


NT-Pro-B Natriuret Pep 











Impressions: 


                                        





Chest X-Ray  08/04/20 19:20


IMPRESSION: 


 


No acute abnormality is identified.


 


 














Assessment and Plan





- Diagnosis


(1) Weakness


Is this a current diagnosis for this admission?: Yes   


Plan: 


It is unclear the origin of patient's generalized weakness however we will 

continue to pursue cardiac workup to rule out cardiac etiology








(2) Elevated troponin


Is this a current diagnosis for this admission?: Yes   


Plan: 


Cardiology input appreciated


Will get echocardiogram


Continue serial troponin








(3) Hypertension


Qualifiers: 


   Hypertension type: essential hypertension   Qualified Code(s): I10 - 

Essential (primary) hypertension   


Is this a current diagnosis for this admission?: Yes   


Plan: 


Adequate BP control


Continue metoprolol succinate 100 mg p.o. daily








(4) Dyslipidemia


Is this a current diagnosis for this admission?: Yes   


Plan: 


Restart home statin (simvastatin 40 mg p.o. daily)








(5) Hypothyroidism


Qualifiers: 


   Hypothyroidism type: unspecified   Qualified Code(s): E03.9 - Hypothyroidism,

unspecified   


Is this a current diagnosis for this admission?: Yes   


Plan: 


Restart home replacement therapy (levothyroxine 0.025 mg p.o. daily)








(6) Hyponatremia


Is this a current diagnosis for this admission?: Yes   


Plan: 


Patient is asymptomatic


No treatment indicated at this time


Monitor








(7) Hyperglycemia


Is this a current diagnosis for this admission?: Yes   


Plan: 


No treatment indicated at this time


If fasting glucose greater than 200 consider adding FS BS with SSI correction 

scale


Monitor with a.m. labs








(8) Thrombocytopenia


Is this a current diagnosis for this admission?: Yes   


Plan: 


Thrombocytopenia is minor


No signs of active hemorrhage or thrombosis


No treatment indicated


Monitor periodically








- Plan Summary


Summary: 


Patient will be admitted observation status on the medical floor in a telemetry 

bed where he received routine supportive and symptomatic cares.  A cardiology 

consultation will be obtained with Dr. Donato.  He will be treated with IV 

fluid using D5LR initially.  He will use Ativan 1 mg IV every 4 hours as needed 

for anxiety or restlessness.  He will use morphine sulfate 2 to 4 mg IV every 2 

hours as needed for pain.  He will be on a cardiac diet.  A thyroid profile, 

lipid profile and serial cardiac enzymes will be obtained.  Additional 

laboratory and/or radiographic evaluations will be obtained as needed.





- Time


Time Spent with patient: 25-34 minutes


Medications reviewed and adjusted accordingly: Yes


Anticipated Discharge Disposition: Home, Self Care


Anticipated Discharge Timeframe: within 48 hours

## 2020-08-05 NOTE — PDOC CONSULTATION
Consultation


Consult Date: 08/05/20


Attending physician:: PAUL J WEILAND


Provider Consulted: GLENROY VIDES


Consult reason:: Indeterminate troponin





History of Present Illness


Admission Date/PCP: 


  08/05/20 03:11





  CALI LEIVA





History of Present Illness: 


LUCIANO JACKSON is a 79 year old male with history of hypertension, 

hypothyroidism, hyperlipidemia, remote smoker who quit 60 years ago and without 

family history of premature coronary artery disease who is consulted to our 

service for evaluation of detectable troponins.  The patient presented to our 

emergency room yesterday with a one-week history of generalized weakness with a 

gradual onset and progressive worsening.  This morning he is found resting in 

bed comfortably and without any symptoms.  He specifically denies chest pain, 

shortness of breath, [place], PND, lower extremity edema, palpitations, syncope and 

presyncope.  He is physically active at home and walks on his treadmill at a 

regular pace for 20 to 30 minutes daily without any ischemic or heart failure 

symptoms.  He is also able to clean his house and work in his yard without 

angina, angina equivalents or heart failure symptoms.





Physical exam on 08/05/2020:


GENERAL:  Pleasant and conversational.  Oriented x3 with normal mood.  Not in 

acute distress.  Well groomed and well developed.


HEENT:  Normocephalic, atraumatic.  Pupils equal.   Sclerae anicteric.  

Oropharynx moist. 


NECK:  No JVD.  No carotid bruits. 


LUNGS:  Clear to auscultation bilaterally.  Normal respiratory effort without 

the use of accessory muscles or intercostal retractions.  


CARDIOVASCULAR:  Regular rate and rhythm, normal S1 and S2 without murmurs, 

rubs, or gallops.  PMI not displaced.


ABDOMEN: No masses or tenderness to palpation.  No bruit.  No splenomegaly or 

hepatomegaly. No abdominal aorta bruit noted.


EXTREMITIES:  No edema, no cyanosis, no clubbing.  +2 pulses femoral and pedal 

pulses bilaterally.  


SKIN: No lesions or rashes.


MUSCULOSKELETAL:  No chest tenderness to palpation. 


NEUROLOGIC: Nonfocal.  No gross sensory or motor deficits bilateral upper or lo

wer extremities.











Past Medical History


Cardiac Medical History: Reports: Hyperlipidema, Hypertension


   Denies: Atrial Fibrillation, Congestive Heart Failure, Coronary Artery 

Disease, DVT, Myocardial Infarction, Pulmonary Embolism


Pulmonary Medical History: 


   Denies: Asthma, Chronic Obstructive Pulmonary Disease (COPD)


EENT Medical History: 


   Denies: Cataracts, Ears - Hearing aids


Neurological Medical History: 


   Denies: Hemorrhagic CVA, Ischemic CVA, Multiple Sclerosis, Seizures


Endocrine Medical History: Reports: Hypothyroidism


   Denies: Diabetes Mellitus Type 1, Diabetes Mellitus Type 2, Hyperthyroidism


Renal/ Medical History: 


   Denies: Chronic Kidney Disease, Nephrolithiasis


Malignancy Medical History: Reports: None


GI Medical History: 


   Denies: Cirrhosis, Crohn's Disease, Gastroesophageal Reflux Disease, 

Hepatitis, Peptic Ulcer Disease, Ulcerative Colitis


Musculoskeltal Medical History: 


   Denies: Arthritis, Gout


Skin Medical History: 


   Denies: Eczema, Psoriasis


Psychiatric Medical History: 


   Denies: Alcohol Dependency, Depression, Substance Abuse, Tobacco Dependency


Traumatic Medical History: Reports: None


Hematology: 


   Denies: Anemia, Bleeding Tendencies


Infectious Medical History: Reports: None





Past Surgical History


Past Surgical History: Reports: None, Appendectomy, Other - Rhinoplasty for 

nasal septal deviation





Social History


Lives with: Friend


Smoking Status: Former Smoker


Electronic Cigarette use?: No


Frequency of Alcohol Use: None


Hx Recreational Drug Use: No


Drugs: None


Hx Prescription Drug Abuse: No





- Advance Directive


Resuscitation Status: Full Code





Family History


Family History: CAD - Father, DM - Sister, Malignancy - Mother.  denies: 

Hypertension


Parental Family History Reviewed: Yes


Children Family History Reviewed: Yes


Sibling(s) Family History Reviewed.: Yes





Medication/Allergy


Home Medications: 








Levothyroxine Sodium [Levo-T] 25 mcg PO DAILY 08/02/20 


Metoprolol Succinate 100 mg PO DAILY 08/02/20 


Simvastatin 40 mg PO DAILY 08/02/20 


Ascorbic Acid [Vitamin C 500 mg Tablet] 500 mg PO DAILY 08/05/20 


Cholecalciferol (Vitamin D3) [Vitamin D3 1000 Unit Tablet] 1,000 unit PO DAILY 

08/05/20 


Multivitamin [Tab-A-Pia (Multiple Vitamin) Tablet] 1 tab PO DAILY 08/05/20 








Allergies/Adverse Reactions: 


                                        





propofol Allergy (Verified 08/05/20 05:36)


   Pruritis











Physical Exam


Vital Signs: 


                                        











Temp Pulse Resp BP Pulse Ox


 


 98.8 F   80   17   155/76 H  97 


 


 08/05/20 04:05  08/05/20 04:05  08/05/20 04:05  08/05/20 04:05  08/05/20 04:05








                                 Intake & Output











 08/04/20 08/05/20 08/06/20





 06:59 06:59 06:59


 


Intake Total  1240 


 


Balance  1240 


 


Weight  72.4 kg 














Results


Laboratory Results: 


                                        





                                 08/04/20 22:33 





                                 08/04/20 22:33 





                                        











  08/04/20 08/04/20 08/04/20





  20:35 20:35 20:35


 


WBC  Cancelled  


 


RBC  Cancelled  


 


Hgb  Cancelled  


 


Hct  Cancelled  


 


MCV  Cancelled  


 


MCH  Cancelled  


 


MCHC  Cancelled  


 


RDW  Cancelled  


 


Plt Count  Cancelled  


 


Seg Neutrophils %  Cancelled  


 


Sodium   Cancelled 


 


Potassium   Cancelled 


 


Chloride   Cancelled 


 


Carbon Dioxide   Cancelled 


 


Anion Gap   Cancelled 


 


BUN   Cancelled 


 


Creatinine   Cancelled 


 


Est GFR ( Amer)   Cancelled 


 


Est GFR (Non-Af Amer)   Cancelled 


 


Glucose   Cancelled 


 


Calcium   Cancelled 


 


Total Bilirubin   Cancelled 


 


AST   Cancelled 


 


Alkaline Phosphatase   Cancelled 


 


Total Protein   Cancelled 


 


Albumin   Cancelled 


 


Urine Color    YELLOW


 


Urine Appearance    SLIGHTLY-CLOUDY


 


Urine pH    5.0


 


Ur Specific Gravity    1.021


 


Urine Protein    100 H


 


Urine Glucose (UA)    NEGATIVE


 


Urine Ketones    NEGATIVE


 


Urine Blood    NEGATIVE


 


Urine Nitrite    NEGATIVE


 


Ur Leukocyte Esterase    NEGATIVE


 


Urine WBC (Auto)    4


 


Urine RBC (Auto)    6














  08/04/20 08/04/20





  22:33 22:33


 


WBC  7.0 


 


RBC  4.86 


 


Hgb  15.2 


 


Hct  43.6 


 


MCV  90 


 


MCH  31.3 


 


MCHC  35.0 


 


RDW  13.9 


 


Plt Count  128 L 


 


Seg Neutrophils %  80.0 H 


 


Sodium   135.6 L


 


Potassium   3.8


 


Chloride   103


 


Carbon Dioxide   27


 


Anion Gap   6


 


BUN   18


 


Creatinine   0.82


 


Est GFR ( Amer)   > 60


 


Est GFR (Non-Af Amer)  


 


Glucose   96


 


Calcium   8.4


 


Total Bilirubin   0.5


 


AST   90 H


 


Alkaline Phosphatase   69


 


Total Protein   6.1 L


 


Albumin   3.3 L


 


Urine Color  


 


Urine Appearance  


 


Urine pH  


 


Ur Specific Gravity  


 


Urine Protein  


 


Urine Glucose (UA)  


 


Urine Ketones  


 


Urine Blood  


 


Urine Nitrite  


 


Ur Leukocyte Esterase  


 


Urine WBC (Auto)  


 


Urine RBC (Auto)  








                                        











  08/04/20 08/04/20 08/04/20





  20:35 20:35 22:33


 


Creatine Kinase  Cancelled   249 H


 


CK-MB (CK-2)   1.86 


 


Troponin I   0.039 


 


NT-Pro-B Natriuret Pep   746 H 














  08/05/20





  01:12


 


Creatine Kinase 


 


CK-MB (CK-2) 


 


Troponin I  0.045


 


NT-Pro-B Natriuret Pep 











Impressions: 


                                        





Chest X-Ray  08/04/20 19:20


IMPRESSION: 


 


No acute abnormality is identified.


 


 








                                        





                                 08/04/20 22:33 





                                 08/04/20 22:33 





                                        











MCV  90 fl (80-97)   08/04/20  22:33    


 


MCH  31.3 pg (27.0-33.4)   08/04/20  22:33    


 


MCHC  35.0 g/dL (32.0-36.0)   08/04/20  22:33    


 


RDW  13.9 % (11.5-14.0)   08/04/20  22:33    


 


Seg Neutrophils %  80.0 % (42-78)  H  08/04/20  22:33    


 


Chloride  103 mmol/L ()   08/04/20  22:33    


 


Carbon Dioxide  27 mmol/L (22-30)   08/04/20  22:33    


 


Anion Gap  6  (5-19)   08/04/20  22:33    


 


Est GFR ( Amer)  > 60  (>60)   08/04/20  22:33    


 


Est GFR (Non-Af Amer)  Cancelled   08/04/20  20:35    


 


Glucose  96 mg/dL ()   08/04/20  22:33    


 


Calcium  8.4 mg/dL (8.4-10.2)   08/04/20  22:33    


 


Total Bilirubin  0.5 mg/dL (0.2-1.3)   08/04/20  22:33    


 


AST  90 U/L (17-59)  H  08/04/20  22:33    


 


Alkaline Phosphatase  69 U/L ()   08/04/20  22:33    


 


Total Protein  6.1 g/dL (6.3-8.2)  L  08/04/20  22:33    


 


Albumin  3.3 g/dL (3.5-5.0)  L  08/04/20  22:33    


 


Urine Color  YELLOW   08/04/20  20:35    


 


Urine Appearance  SLIGHTLY-CLOUDY   08/04/20  20:35    


 


Urine pH  5.0  (5.0-9.0)   08/04/20  20:35    


 


Ur Specific Gravity  1.021   08/04/20  20:35    


 


Urine Protein  100 mg/dL (NEGATIVE)  H  08/04/20  20:35    


 


Urine Glucose (UA)  NEGATIVE mg/dL (NEGATIVE)   08/04/20  20:35    


 


Urine Ketones  NEGATIVE mg/dL (NEGATIVE)   08/04/20  20:35    


 


Urine Blood  NEGATIVE  (NEGATIVE)   08/04/20  20:35    


 


Urine Nitrite  NEGATIVE  (NEGATIVE)   08/04/20  20:35    


 


Ur Leukocyte Esterase  NEGATIVE  (NEGATIVE)   08/04/20  20:35    


 


Urine WBC (Auto)  4 /HPF  08/04/20  20:35    


 


Urine RBC (Auto)  6 /HPF  08/04/20  20:35    








                                        











  08/04/20 08/04/20 08/04/20





  20:35 20:35 22:33


 


Creatine Kinase  Cancelled   249 H


 


CK-MB (CK-2)   1.86 


 


Troponin I   0.039 


 


NT-Pro-B Natriuret Pep   746 H 














  08/05/20





  01:12


 


Creatine Kinase 


 


CK-MB (CK-2) 


 


Troponin I  0.045


 


NT-Pro-B Natriuret Pep 








                             Current Medication List











Generic Name Dose Route Start Last Admin





  Trade Name Freq  PRN Reason Stop Dose Admin


 


Acetaminophen  650 mg  08/05/20 03:20 





  Tylenol 325 Mg Tablet  PO  09/04/20 03:19 





  Q4HP PRN  





  For headache, pain or fever  


 


Al Hydrox/Mg Hydrox/Simethicone  30 ml  08/05/20 03:20 





  Maalox Plus Susp 30 Udcup  PO  09/04/20 03:19 





  Q6HP PRN  





  HEARTBURN  


 


Docusate Sodium  100 mg  08/05/20 10:00 





  Colace 100 Mg Capsule  PO  09/04/20 09:59 





  BID DL  


 


Famotidine  20 mg  08/05/20 10:00 





  Pepcid 20 Mg Tablet  PO  09/04/20 09:59 





  Q12 DL  


 


Guaifenesin  200 mg  08/05/20 03:20 





  Robitussin Syrup 200 Mg/10 Ml Ud Cup  PO  09/04/20 03:19 





  Q4HP PRN  





  COUGH  


 


Heparin Sodium (Porcine)  5,000 unit  08/05/20 06:00  08/05/20 06:32





  Heparin Inj 5,000 Units/Ml 1 Ml Vial  SUBCUT  09/04/20 05:59  Not Given





  Q8 DL  


 


Levalbuterol HCl  0.63 mg  08/05/20 03:26 





  Xopenex Neb 0.63 Mg/3 Ml Ampul  NEB  09/04/20 03:25 





  RTQ2HP PRN  





  SHORTNESS OF BREATH  


 


Lorazepam  1 mg  08/05/20 03:20 





  Ativan Inj 2 Mg/1 Ml Vial  IV  08/12/20 03:19 





  Q4HP PRN  





  ANXIETY/AGITATION  


 


Magnesium Hydroxide  30 ml  08/05/20 03:20 





  Milk Of Magnesia 30 Ml Udcup  PO  09/04/20 03:19 





  HSP PRN  





  FOR CONSTIPATION  


 


Melatonin  5 mg  08/05/20 03:20 





  Melatonin 5 Mg Tablet  PO  09/04/20 03:19 





  HSP PRN  





  SLEEP OR INSOMNIA  


 


Metoprolol Tartrate  5 mg  08/05/20 05:18 





  Lopressor Inj/Pf 5 Mg/5 Ml Sdv  IV  09/04/20 05:17 





  Q4HP PRN  





  Give For Sbp > 160 / Dbp > 100  


 


Morphine Sulfate  2 mg  08/05/20 03:50 





  Morphine 10 Mg/Ml Inj  IV  08/12/20 03:49 





  Q2HP PRN  





  PAIN SCALE OF 2  


 


Morphine Sulfate  3 mg  08/05/20 03:51 





  Morphine 10 Mg/Ml Inj  IV  08/12/20 03:50 





  Q2HP PRN  





  FOR PAIN SCALE 3-4  


 


Morphine Sulfate  4 mg  08/05/20 03:51 





  Morphine 10 Mg/Ml Inj  IV  08/12/20 03:50 





  Q2HP PRN  





  PAIN SCALE OF 5  


 


Ondansetron HCl  4 mg  08/05/20 03:26 





  Zofran Inj/Pf 4 Mg/2 Ml Sdv  IV  09/04/20 03:25 





  Q4HP PRN  





  FOR NAUSEA/VOMITING  


 


Sodium Chloride  2.5 ml  08/05/20 06:00  08/05/20 06:24





  Saline Flush 2.5 Ml Monoject Prefil Syrin  IV  09/04/20 05:59  2.5 ml





  Q8 DL   Administration














Discontinued Medications














Generic Name Dose Route Start Last Admin





  Trade Name Freq  PRN Reason Stop Dose Admin


 


Famotidine  20 mg  08/05/20 04:00  08/05/20 06:32





  Pepcid 20 Mg Tablet  PO  08/05/20 04:01  Not Given





  NOW ONE  


 


Famotidine  Confirm  08/05/20 06:31  08/05/20 06:43





  Pepcid 20 Mg Tablet  Administered  08/05/20 06:32  Not Given





  Dose  





  20 mg  





  .ROUTE  





  .STK-MED ONE  


 


Sodium Chloride  1,000 mls @ 0 mls/hr  08/04/20 19:20  08/04/20 21:51





  Nacl 0.9% 1000 Ml Iv Soln  IV  08/04/20 19:21  Infused





  BOLUS ONE   Infusion





  Wide Open  


 


Dextrose/Lactated Ringer's  1,000 mls @ 250 mls/hr  08/05/20 05:17  08/05/20 

06:22





  D5lr 1000 Ml Iv Soln  IV   250 mls/hr





  CONTINUOUS PRN   Administration





  THIS MED IS NOT "PRN"  


 


Morphine Sulfate  2 - 4 mg  08/05/20 03:20 





  Morphine 10 Mg/Ml Inj  IV  08/12/20 03:19 





  Q2HP PRN  





  See protocol  





  Protocol  














Assessment & Plan





- Diagnosis


(1) Troponin I above reference range


Plan: 


79-year-old male with cardiac risk factors of age, gender, hypertension and 

hyperlipidemia who was admitted for evaluation of generalized weakness and who 

has remained asymptomatic since admission.  His cardiac troponins are detectable

however in the indeterminate range.  His troponin level is actually 

nondiagnostic particularly in the setting of no ischemic symptoms whatsoever 

particularly when he exercises on his treadmill for up to 30 minutes every day 

at a regular pace without any angina, anginal equivalents or heart failure 

symptoms.  Given that his most recent troponin level has been uptrending I 

recommend keeping him under observation for now and keep trending troponins.  

Further management will be guided by his symptoms and troponin level.  Given his

cardiac risk factors I recommend echocardiography.

## 2020-08-05 NOTE — PDOC H&P
History of Present Illness


Admission Date/PCP: 


08/05/2020   02:52  


CALI LEIVA


Patient complains of: Generalized weakness


History of Present Illness: 


LUCIANO JACKSON is a 79 year old male who presented emergency room with a one-

week history of generalized weakness.  He admits the gradual onset and 

progressive worsening of (moderate over the last 3 days) continuous generalized 

weakness over the course of the last week.  His weakness has been accompanied by

continuous fatigue and has been associated with intermittent episodes of fever. 

He further admits a 3-week history of treatment by his primary care provider for

dizziness due to his blood pressure medication.  He denies other associated or 

accompanying signs and symptoms.  He was seen in ER 2 days prior to his current 

visit and was treated with a azithromycin and released to home after being 

tested for COVID-19.  His COVID-19 test was negative and he "felt a little 

better" until he had another episode of fever just prior to coming to the 

emergency room.  He denies prior similar episodes.  He has not identified any 

aggravating or ameliorating factors for his generalized weakness.  In the 

emergency room he was found to have a minimal elevation of his troponin level 

and a mild increase in his liver function tests.  His BNP was noted to be mildly

elevated.  Patient was subsequently admitted to the hospital on observation stat

us for further evaluation and treatment.





Past Medical History


Cardiac Medical History: Reports: Hyperlipidema, Hypertension


   Denies: Atrial Fibrillation, Congestive Heart Failure, Coronary Artery 

Disease, DVT, Myocardial Infarction, Pulmonary Embolism


Pulmonary Medical History: 


   Denies: Asthma, Chronic Obstructive Pulmonary Disease (COPD)


EENT Medical History: 


   Denies: Cataracts, Ears - Hearing aids


Neurological Medical History: 


   Denies: Hemorrhagic CVA, Ischemic CVA, Multiple Sclerosis, Seizures


Endocrine Medical History: Reports: Hypothyroidism


   Denies: Diabetes Mellitus Type 1, Diabetes Mellitus Type 2, Hyperthyroidism


Renal/ Medical History: 


   Denies: Chronic Kidney Disease, Nephrolithiasis


Malignancy Medical History: Reports: None


GI Medical History: 


   Denies: Cirrhosis, Crohn's Disease, Gastroesophageal Reflux Disease, 

Hepatitis, Peptic Ulcer Disease, Ulcerative Colitis


Musculoskeltal Medical History: 


   Denies: Arthritis, Gout


Skin Medical History: 


   Denies: Eczema, Psoriasis


Psychiatric Medical History: 


   Denies: Alcohol Dependency, Substance Abuse, Tobacco Dependency


Traumatic Medical History: Reports: None


Hematology: 


   Denies: Anemia, Bleeding Tendencies


Infectious Medical History: Reports: None





Past Surgical History


Past Surgical History: Reports: Appendectomy, Other - Rhinoplasty for nasal 

septal deviation





Social History


Information Source: Patient


Lives with: Friend


Smoking Status: Former Smoker


Electronic Cigarette use?: No


Frequency of Alcohol Use: None


Hx Recreational Drug Use: No


Drugs: None


Hx Prescription Drug Abuse: No





- Advance Directive


Resuscitation Status: Full Code


Surrogate healthcare decision maker:: 


Julieta Baker





Family History


Family History: CAD - Father, DM - Sister, Malignancy - Mother.  denies: 

Hypertension


Parental Family History Reviewed: Yes


Children Family History Reviewed: No


Sibling(s) Family History Reviewed.: Yes





Medication/Allergy


Home Medications: 








Azithromycin [Zithromax 250 mg Tablet] 250 mg PO ASDIR PRN #6 tablet 08/02/20 


Levothyroxine Sodium [Levo-T]  08/02/20 


Metoprolol Succinate  08/02/20 


Simvastatin  08/02/20 








Allergies/Adverse Reactions: 


                                        





No Known Allergies Allergy (Verified 08/02/20 12:59)


   











Review of Systems


Constitutional: PRESENT: as per HPI, fatigue, fever(s), weakness.  ABSENT: chil

ls


Eyes: ABSENT: visual disturbances, other - Eye pain


Ears: ABSENT: hearing changes, other - Ear pain


Nose, Mouth, and Throat: ABSENT: headache(s), sore throat


Cardiovascular: ABSENT: chest pain, edema, orthropnea, palpitations


Respiratory: ABSENT: cough, dyspnea


Gastrointestinal: ABSENT: abdominal pain, constipation, diarrhea, nausea, 

vomiting


Genitourinary: ABSENT: dysuria, hematuria


Musculoskeletal: PRESENT: as per HPI, muscle weakness - Generalized.  ABSENT: 

back pain, joint swelling


Integumentary: ABSENT: pruritus, rash


Neurological: PRESENT: as per HPI, dizziness.  ABSENT: confusion, convulsions, 

focal weakness, memory loss, syncope


Psychiatric: ABSENT: anxiety, depression


Endocrine: ABSENT: cold intolerance, heat intolerance


Hematologic/Lymphatic: ABSENT: easy bleeding, easy bruising


Allergic/Immunologic: ABSENT: seasonal rhinorrhea





Physical Exam


Vital Signs: 


                                        











Temp Pulse Resp BP Pulse Ox


 


 98.6 F   76   16   145/72 H  96 


 


 08/05/20 02:43  08/05/20 02:43  08/05/20 02:43  08/05/20 02:43  08/05/20 02:43








                                 Intake & Output











 08/03/20 08/04/20 08/05/20





 23:59 23:59 23:59


 


Intake Total  1000 


 


Balance  1000 


 


Weight  70.307 kg 











General appearance: PRESENT: no acute distress, cooperative


Head exam: PRESENT: atraumatic, normocephalic


Eye exam: PRESENT: conjunctiva pink.  ABSENT: conjunctival injection, scleral 

icterus


Ear exam: PRESENT: normal external ear exam.  ABSENT: bleeding, drainage


Mouth exam: PRESENT: dry mucosa, neck supple


Neck exam: ABSENT: thyromegaly, tracheal deviation


Respiratory exam: PRESENT: symmetrical, unlabored


Cardiovascular exam: PRESENT: RRR.  ABSENT: clicks, gallop, rubs


Pulses: PRESENT: normal radial pulses, normal dorsalis pedis pul


Vascular exam: PRESENT: normal capillary refill.  ABSENT: pallor


GI/Abdominal exam: PRESENT: normal bowel sounds, soft.  ABSENT: tenderness


Rectal exam: PRESENT: deferred


Extremities exam: ABSENT: joint swelling, pedal edema


Musculoskeletal exam: ABSENT: deformity, dislocation


Neurological exam: PRESENT: alert, oriented to person, oriented to place, 

oriented to time, oriented to situation, CN II-XII grossly intact.  ABSENT: 

motor sensory deficit


Psychiatric exam: PRESENT: appropriate affect, normal mood


Skin exam: PRESENT: dry, intact, warm.  ABSENT: jaundice, rash, urticaria





Results


Laboratory Results: 


                                        





                                 08/04/20 22:33 





                                 08/04/20 22:33 





                                        











  08/04/20 08/04/20 08/04/20





  20:35 20:35 20:35


 


WBC  Cancelled  


 


RBC  Cancelled  


 


Hgb  Cancelled  


 


Hct  Cancelled  


 


MCV  Cancelled  


 


MCH  Cancelled  


 


MCHC  Cancelled  


 


RDW  Cancelled  


 


Plt Count  Cancelled  


 


Seg Neutrophils %  Cancelled  


 


Sodium   Cancelled 


 


Potassium   Cancelled 


 


Chloride   Cancelled 


 


Carbon Dioxide   Cancelled 


 


Anion Gap   Cancelled 


 


BUN   Cancelled 


 


Creatinine   Cancelled 


 


Est GFR ( Amer)   Cancelled 


 


Est GFR (Non-Af Amer)   Cancelled 


 


Glucose   Cancelled 


 


Calcium   Cancelled 


 


Total Bilirubin   Cancelled 


 


AST   Cancelled 


 


Alkaline Phosphatase   Cancelled 


 


Total Protein   Cancelled 


 


Albumin   Cancelled 


 


Urine Color    YELLOW


 


Urine Appearance    SLIGHTLY-CLOUDY


 


Urine pH    5.0


 


Ur Specific Gravity    1.021


 


Urine Protein    100 H


 


Urine Glucose (UA)    NEGATIVE


 


Urine Ketones    NEGATIVE


 


Urine Blood    NEGATIVE


 


Urine Nitrite    NEGATIVE


 


Ur Leukocyte Esterase    NEGATIVE


 


Urine WBC (Auto)    4


 


Urine RBC (Auto)    6














  08/04/20 08/04/20





  22:33 22:33


 


WBC  7.0 


 


RBC  4.86 


 


Hgb  15.2 


 


Hct  43.6 


 


MCV  90 


 


MCH  31.3 


 


MCHC  35.0 


 


RDW  13.9 


 


Plt Count  128 L 


 


Seg Neutrophils %  80.0 H 


 


Sodium   135.6 L


 


Potassium   3.8


 


Chloride   103


 


Carbon Dioxide   27


 


Anion Gap   6


 


BUN   18


 


Creatinine   0.82


 


Est GFR ( Amer)   > 60


 


Est GFR (Non-Af Amer)  


 


Glucose   96


 


Calcium   8.4


 


Total Bilirubin   0.5


 


AST   90 H


 


Alkaline Phosphatase   69


 


Total Protein   6.1 L


 


Albumin   3.3 L


 


Urine Color  


 


Urine Appearance  


 


Urine pH  


 


Ur Specific Gravity  


 


Urine Protein  


 


Urine Glucose (UA)  


 


Urine Ketones  


 


Urine Blood  


 


Urine Nitrite  


 


Ur Leukocyte Esterase  


 


Urine WBC (Auto)  


 


Urine RBC (Auto)  








                                        











  08/04/20 08/04/20 08/04/20





  20:35 20:35 22:33


 


Creatine Kinase  Cancelled   249 H


 


CK-MB (CK-2)   1.86 


 


Troponin I   0.039 


 


NT-Pro-B Natriuret Pep   746 H 














  08/05/20





  01:12


 


Creatine Kinase 


 


CK-MB (CK-2) 


 


Troponin I  0.045


 


NT-Pro-B Natriuret Pep 











Impressions: 


                                        





Chest X-Ray  08/04/20 19:20


IMPRESSION: 


 


No acute abnormality is identified.


 


 














Assessment and Plan





- Diagnosis


(1) Weakness


Is this a current diagnosis for this admission?: Yes   





(2) Dizziness


Is this a current diagnosis for this admission?: Yes   





(3) Fever


Qualifiers: 


   Fever type: unspecified   Qualified Code(s): R50.9 - Fever, unspecified   


Is this a current diagnosis for this admission?: Yes   





(4) Elevated troponin


Is this a current diagnosis for this admission?: Yes   





(5) Elevated brain natriuretic peptide (BNP) level


Is this a current diagnosis for this admission?: Yes   





(6) Hypertension


Qualifiers: 


   Hypertension type: essential hypertension   Qualified Code(s): I10 - 

Essential (primary) hypertension   


Is this a current diagnosis for this admission?: Yes   





(7) Hyperlipidemia


Qualifiers: 


   Hyperlipidemia type: unspecified   Qualified Code(s): E78.5 - Hyperlipidemia,

unspecified   


Is this a current diagnosis for this admission?: Yes   





(8) Hypothyroidism


Qualifiers: 


   Hypothyroidism type: unspecified   Qualified Code(s): E03.9 - Hypothyroidism,

unspecified   


Is this a current diagnosis for this admission?: Yes   





- Plan Summary


Summary: 


Patient will be admitted observation status on the medical floor in a telemetry 

bed where he received routine supportive and symptomatic cares.  A cardiology 

consultation will be obtained with Dr. Donato.  He will be treated with IV 

fluid using D5LR initially.  He will use Ativan 1 mg IV every 4 hours as needed 

for anxiety or restlessness.  He will use morphine sulfate 2 to 4 mg IV every 2 

hours as needed for pain.  He will be on a cardiac diet.  A thyroid profile, 

lipid profile and serial cardiac enzymes will be obtained.  Additional 

laboratory and/or radiographic evaluations will be obtained as needed.





- Time


Time Spent with patient: 15-24 minutes


Medications reviewed and adjusted accordingly: Yes


Anticipated Discharge Disposition: Home, Self Care


Anticipated Discharge Timeframe: within 36 hours





- Inpatient Certification


Based on my medical assessment, after consideration of the patient's 

comorbidities, presenting symptoms, or acuity I expect that the services needed 

warrant INPATIENT care.: No


I certify that my determination is in accordance with my understanding of 

Medicare's requirements for reasonable and necessary INPATIENT services [42 CFR 

412.3e].: No

## 2020-08-06 VITALS — SYSTOLIC BLOOD PRESSURE: 155 MMHG | DIASTOLIC BLOOD PRESSURE: 76 MMHG

## 2020-08-06 LAB
ALBUMIN SERPL-MCNC: 3.3 G/DL (ref 3.5–5)
ALP SERPL-CCNC: 68 U/L (ref 38–126)
ANION GAP SERPL CALC-SCNC: 8 MMOL/L (ref 5–19)
AST SERPL-CCNC: 97 U/L (ref 17–59)
BILIRUB DIRECT SERPL-MCNC: 0 MG/DL (ref 0–0.4)
BILIRUB SERPL-MCNC: 0.5 MG/DL (ref 0.2–1.3)
BUN SERPL-MCNC: 12 MG/DL (ref 7–20)
CALCIUM: 8.7 MG/DL (ref 8.4–10.2)
CHLORIDE SERPL-SCNC: 104 MMOL/L (ref 98–107)
CO2 SERPL-SCNC: 27 MMOL/L (ref 22–30)
ERYTHROCYTE [DISTWIDTH] IN BLOOD BY AUTOMATED COUNT: 14.2 % (ref 11.5–14)
GLUCOSE SERPL-MCNC: 92 MG/DL (ref 75–110)
HCT VFR BLD CALC: 43.8 % (ref 37.9–51)
HGB BLD-MCNC: 15.1 G/DL (ref 13.5–17)
MCH RBC QN AUTO: 31.3 PG (ref 27–33.4)
MCHC RBC AUTO-ENTMCNC: 34.4 G/DL (ref 32–36)
MCV RBC AUTO: 91 FL (ref 80–97)
PLATELET # BLD: 161 10^3/UL (ref 150–450)
POTASSIUM SERPL-SCNC: 3.4 MMOL/L (ref 3.6–5)
PROT SERPL-MCNC: 6.2 G/DL (ref 6.3–8.2)
RBC # BLD AUTO: 4.82 10^6/UL (ref 4.35–5.55)
WBC # BLD AUTO: 4.8 10^3/UL (ref 4–10.5)

## 2020-08-06 RX ADMIN — Medication SCH: at 06:10

## 2020-08-06 RX ADMIN — METOPROLOL SUCCINATE SCH MG: 50 TABLET, EXTENDED RELEASE ORAL at 10:11

## 2020-08-06 RX ADMIN — HEPARIN SODIUM SCH: 5000 INJECTION, SOLUTION INTRAVENOUS; SUBCUTANEOUS at 06:09

## 2020-08-06 RX ADMIN — DOCUSATE SODIUM SCH MG: 100 CAPSULE, LIQUID FILLED ORAL at 10:12

## 2020-08-06 RX ADMIN — FAMOTIDINE SCH: 20 TABLET, FILM COATED ORAL at 10:07

## 2020-08-06 NOTE — PDOC PROGRESS REPORT
Subjective


Progress Note for:: 08/06/20


Subjective:: 


Patient states that he feels better this a.m.  He reports that he has more 

strength.  Denies chest pain and shortness of breath


Reason For Visit: 


ELEVATED TROPONIN,ELEVATED BNP,GENERALIZED WEAKNES








Physical Exam


Vital Signs: 


                                        











Temp Pulse Resp BP Pulse Ox


 


 97.6 F   68   19   132/68 H  99 


 


 08/06/20 08:08  08/06/20 08:08  08/06/20 08:08  08/06/20 08:08  08/06/20 08:08








                                 Intake & Output











 08/05/20 08/06/20 08/07/20





 06:59 06:59 06:59


 


Intake Total 1240 2150 


 


Output Total  150 


 


Balance 1240 2000 


 


Weight 72.4 kg 72.4 kg 











General appearance: PRESENT: no acute distress, cooperative, well-developed, 

well-nourished


Head exam: PRESENT: atraumatic, normocephalic


Eye exam: PRESENT: conjunctiva pink


Mouth exam: PRESENT: moist, tongue midline


Neck exam: ABSENT: JVD


Respiratory exam: PRESENT: clear to auscultation rigoberto, symmetrical, unlabored.  

ABSENT: accessory muscle use


Cardiovascular exam: PRESENT: RRR, +S1, +S2


Pulses: PRESENT: normal radial pulses


Vascular exam: PRESENT: normal capillary refill


GI/Abdominal exam: PRESENT: normal bowel sounds, soft.  ABSENT: distended, 

tenderness


Rectal exam: PRESENT: deferred


Extremities exam: ABSENT: calf tenderness


Musculoskeletal exam: PRESENT: ambulatory, full ROM


Neurological exam: PRESENT: alert, awake, oriented to person, oriented to place,

oriented to time, oriented to situation, CN II-XII grossly intact.  ABSENT: 

motor sensory deficit


Psychiatric exam: PRESENT: appropriate affect, normal mood.  ABSENT: agitated, 

anxious


Skin exam: PRESENT: dry, normal color, warm





Results


Laboratory Results: 


                                        





                                 08/06/20 07:30 





                                 08/06/20 07:30 





                                        











  08/06/20 08/06/20





  07:30 07:30


 


WBC  4.8 


 


RBC  4.82 


 


Hgb  15.1 


 


Hct  43.8 


 


MCV  91 


 


MCH  31.3 


 


MCHC  34.4 


 


RDW  14.2 H 


 


Plt Count  161 


 


Sodium   138.5


 


Potassium   3.4 L


 


Chloride   104


 


Carbon Dioxide   27


 


Anion Gap   8


 


BUN   12


 


Creatinine   0.70


 


Est GFR (African Amer)   > 60


 


Glucose   92


 


Calcium   8.7


 


Magnesium   2.1


 


Total Bilirubin   0.5


 


AST   97 H


 


Alkaline Phosphatase   68


 


Total Protein   6.2 L


 


Albumin   3.3 L








                                        











  08/04/20 08/04/20 08/04/20





  20:35 20:35 22:33


 


Creatine Kinase  Cancelled   249 H


 


CK-MB (CK-2)   1.86 


 


Troponin I   0.039 


 


NT-Pro-B Natriuret Pep   746 H 














  08/05/20 08/05/20 08/05/20





  01:12 07:30 07:30


 


Creatine Kinase   Cancelled 


 


CK-MB (CK-2)    1.67


 


Troponin I  0.045   0.055


 


NT-Pro-B Natriuret Pep   














  08/05/20 08/05/20 08/05/20





  07:30 13:53 13:53


 


Creatine Kinase  168  143 


 


CK-MB (CK-2)    1.12


 


Troponin I    0.057


 


NT-Pro-B Natriuret Pep   














  08/05/20 08/05/20 08/05/20





  18:39 19:28 19:28


 


Creatine Kinase   139 


 


CK-MB (CK-2)    1.08


 


Troponin I  0.058   0.059


 


NT-Pro-B Natriuret Pep   














  08/06/20 08/06/20





  01:27 07:30


 


Creatine Kinase  


 


CK-MB (CK-2)  


 


Troponin I  0.054  0.037


 


NT-Pro-B Natriuret Pep  











Impressions: 


                                        





Chest X-Ray  08/04/20 19:20


IMPRESSION: 


 


No acute abnormality is identified.


 


 














Assessment and Plan





- Diagnosis


(1) Weakness


Is this a current diagnosis for this admission?: Yes   


Plan: 


It is unclear the origin of patient's generalized weakness however we will 

continue to pursue cardiac workup to rule out cardiac etiology


Patient reports that he feels stronger today








(2) Elevated troponin


Is this a current diagnosis for this admission?: Yes   


Plan: 


Cardiology input appreciated


Troponin trending down with peak of 0.059


Discussed case with cardiologist and will plan for stress test tomorrow








(3) Hypertension


Qualifiers: 


   Hypertension type: essential hypertension   Qualified Code(s): I10 - 

Essential (primary) hypertension   


Is this a current diagnosis for this admission?: Yes   


Plan: 


Adequate BP control


Continue metoprolol succinate 100 mg p.o. daily








(4) Dyslipidemia


Is this a current diagnosis for this admission?: Yes   


Plan: 


Continue simvastatin 40 mg p.o. daily








(5) Hypothyroidism


Qualifiers: 


   Hypothyroidism type: unspecified   Qualified Code(s): E03.9 - Hypothyroidism,

unspecified   


Is this a current diagnosis for this admission?: Yes   


Plan: 


Continue levothyroxine 0.025 mg p.o. daily








(6) Hyponatremia


Is this a current diagnosis for this admission?: Yes   


Plan: 


Serum sodium WNL this a.m.


Monitor periodically








(7) Hyperglycemia


Is this a current diagnosis for this admission?: Yes   


Plan: 


Fasting glucose WNL this a.m.


Monitor with a.m. labs








(8) Thrombocytopenia


Is this a current diagnosis for this admission?: Yes   


Plan: 


Platelet count WNL this a.m.


No signs of active hemorrhage or thrombosis


Monitor periodically








- Time


Time Spent with patient: 25-34 minutes


Medications reviewed and adjusted accordingly: Yes


Anticipated Discharge Disposition: Home, Self Care


Anticipated Discharge Timeframe: within 36 hours

## 2020-08-06 NOTE — PDOC DISCHARGE SUMMARY
Impression





- Admit/DC Date/PCP


Admission Date/Primary Care Provider: 


  08/05/20 03:11





  CALI LEIVA





Discharge Date: 08/06/20





- Discharge Diagnosis


(1) Weakness


Is this a current diagnosis for this admission?: Yes   





(2) Elevated troponin


Is this a current diagnosis for this admission?: Yes   





(3) Hypertension


Is this a current diagnosis for this admission?: Yes   





(4) Dyslipidemia


Is this a current diagnosis for this admission?: Yes   





(5) Hypothyroidism


Is this a current diagnosis for this admission?: Yes   





(6) Hyponatremia


Is this a current diagnosis for this admission?: Yes   





(7) Hyperglycemia


Is this a current diagnosis for this admission?: Yes   





(8) Thrombocytopenia


Is this a current diagnosis for this admission?: Yes   





- Additional Information


Resuscitation Status: Full Code


Discharge Diet: Cardiac


Discharge Activity: Activity As Tolerated


Referrals: 


GLENROY VIDES MD [ACTIVE PROVISIONAL STAFF] - 08/18/20 1:30 pm


Home Medications: 








Levothyroxine Sodium [Levo-T] 25 mcg PO DAILY 08/02/20 


Metoprolol Succinate 100 mg PO DAILY 08/02/20 


Simvastatin 40 mg PO DAILY 08/02/20 


Ascorbic Acid [Vitamin C 500 mg Tablet] 500 mg PO DAILY 08/05/20 


Cholecalciferol (Vitamin D3) [Vitamin D3 1000 Unit Tablet] 1,000 unit PO DAILY 

08/05/20 


Multivitamin [Tab-A-Pia (Multiple Vitamin) Tablet] 1 tab PO DAILY 08/05/20 











History of Present Illiness


History of Present Illness: 


LUCIANO JACKSON is a 79 year old male with PMH significant for HTN and 

dyslipidemia who presented to the ED with complaints of a 1 week history of 

generalized weakness.  The weakness was of gradual onset but got progressively 

worse over the preceding days prior to presentation.  Of note, there were no 

focal weaknesses noted.  Additionally, he reported that he had had intermittent 

episodes of fever.  2 days prior to this presentation he was in the ED for 

similar symptoms.  At that time he was tested for COVID-19 which was negative.  

Upon return to the ED a chest x-ray was completed which showed no acute 

abnormality.  He did not complain of any chest discomfort however a troponin was

obtained in the ED which was slightly elevated.  The hospitalist service was 

consulted to admit the patient for further evaluation and treatment.





Hospital Course


Hospital Course: 


Subsequent serum troponins remained slightly elevated and a cardiology 

consultation was requested.  There were no acute ST/T wave changes noted on the 

EKG.  An echocardiogram was completed which revealed normal LV function with an 

LVEF of 65 to 70%.  Normal RV function abd trace MR.  Given the slight elevation

in troponin it was decided by cardiology that the patient would benefit from 

stress test.  He was taken to the stress lab on 08/06/2020 where he underwent a 

treadmill exercise stress test.  The stress test was electrically and 

symptomatically negative for ischemia.  He did have mildly decreased exercise 

tolerance.  After review of findings the patient was cleared for discharge by 

cardiology.





Physical Exam


Vital Signs: 


                                        











Temp Pulse Resp BP Pulse Ox


 


 97.6 F   68   19   132/68 H  99 


 


 08/06/20 08:08  08/06/20 08:08  08/06/20 08:08  08/06/20 08:08  08/06/20 08:08








                                 Intake & Output











 08/05/20 08/06/20 08/07/20





 06:59 06:59 06:59


 


Intake Total 1240 2150 


 


Output Total  150 


 


Balance 1240 2000 


 


Weight 72.4 kg 72.4 kg 











General appearance: PRESENT: no acute distress, cooperative, well-developed, 

well-nourished


Head exam: PRESENT: atraumatic, normocephalic


Eye exam: PRESENT: conjunctiva pink


Mouth exam: PRESENT: moist, tongue midline


Neck exam: ABSENT: JVD


Respiratory exam: PRESENT: clear to auscultation rigoberto, symmetrical, unlabored.  

ABSENT: accessory muscle use


Cardiovascular exam: PRESENT: RRR, +S1, +S2


Pulses: PRESENT: normal radial pulses


Vascular exam: PRESENT: normal capillary refill


GI/Abdominal exam: PRESENT: normal bowel sounds, soft.  ABSENT: distended, 

tenderness


Rectal exam: PRESENT: deferred


Extremities exam: ABSENT: calf tenderness, pedal edema


Neurological exam: PRESENT: alert, oriented to person, oriented to place, 

oriented to time, oriented to situation, CN II-XII grossly intact.  ABSENT: 

motor sensory deficit


Psychiatric exam: PRESENT: appropriate affect, normal mood.  ABSENT: agitated, 

anxious


Skin exam: PRESENT: dry, normal color, warm





Results


Laboratory Results: 


                                        











WBC  4.8 10^3/uL (4.0-10.5)   08/06/20  07:30    


 


RBC  4.82 10^6/uL (4.35-5.55)   08/06/20  07:30    


 


Hgb  15.1 g/dL (13.5-17.0)   08/06/20  07:30    


 


Hct  43.8 % (37.9-51.0)   08/06/20  07:30    


 


MCV  91 fl (80-97)   08/06/20  07:30    


 


MCH  31.3 pg (27.0-33.4)   08/06/20  07:30    


 


MCHC  34.4 g/dL (32.0-36.0)   08/06/20  07:30    


 


RDW  14.2 % (11.5-14.0)  H  08/06/20  07:30    


 


Plt Count  161 10^3/uL (150-450)   08/06/20  07:30    


 


Lymph % (Auto)  9.6 % (13-45)  L  08/04/20  22:33    


 


Mono % (Auto)  7.6 % (3-13)   08/04/20  22:33    


 


Eos % (Auto)  2.3 % (0-6)   08/04/20 22:33    


 


Baso % (Auto)  0.5 % (0-2)   08/04/20  22:33    


 


Absolute Neuts (auto)  5.6 10^3/uL (1.7-8.2)   08/04/20  22:33    


 


Absolute Lymphs (auto)  0.7 10^3/uL (0.5-4.7)   08/04/20  22:33    


 


Absolute Monos (auto)  0.5 10^3/uL (0.1-1.4)   08/04/20  22:33    


 


Absolute Eos (auto)  0.2 10^3/uL (0.0-0.6)   08/04/20  22:33    


 


Absolute Basos (auto)  0.0 10^3/uL (0.0-0.2)   08/04/20  22:33    


 


Seg Neutrophils %  80.0 % (42-78)  H  08/04/20  22:33    


 


Platelet Estimate  Cancelled   08/04/20  20:35    


 


PT  13.3 SEC (11.4-15.4)   08/04/20  20:35    


 


INR  0.99   08/04/20  20:35    


 


Sodium  138.5 mmol/L (137-145)   08/06/20  07:30    


 


Potassium  3.4 mmol/L (3.6-5.0)  L  08/06/20  07:30    


 


Chloride  104 mmol/L ()   08/06/20  07:30    


 


Carbon Dioxide  27 mmol/L (22-30)   08/06/20  07:30    


 


Anion Gap  8  (5-19)   08/06/20  07:30    


 


BUN  12 mg/dL (7-20)   08/06/20  07:30    


 


Creatinine  0.70 mg/dL (0.52-1.25)   08/06/20  07:30    


 


Est GFR ( Amer)  > 60  (>60)   08/06/20  07:30    


 


Est GFR (Non-Af Amer)  Cancelled   08/04/20  20:35    


 


Est GFR (MDRD) Non-Af  > 60  (>60)   08/06/20  07:30    


 


Glucose  92 mg/dL ()   08/06/20  07:30    


 


Calcium  8.7 mg/dL (8.4-10.2)   08/06/20  07:30    


 


Magnesium  2.1 mg/dL (1.6-2.3)   08/06/20  07:30    


 


Total Bilirubin  0.5 mg/dL (0.2-1.3)   08/06/20  07:30    


 


Direct Bilirubin  0.0 mg/dL (0.0-0.4)   08/06/20  07:30    


 


Neonat Total Bilirubin  Not Reportable   08/06/20  07:30    


 


Neonat Direct Bilirubin  Not Reportable   08/06/20  07:30    


 


Neonat Indirect Bili  Not Reportable   08/06/20  07:30    


 


AST  97 U/L (17-59)  H  08/06/20  07:30    


 


ALT  89 U/L (<50)  H  08/06/20  07:30    


 


Alkaline Phosphatase  68 U/L ()   08/06/20  07:30    


 


Creatine Kinase  139 U/L ()   08/05/20  19:28    


 


CK-MB (CK-2)  1.08 ng/mL (<4.55)   08/05/20  19:28    


 


Troponin I  0.037 ng/mL  08/06/20  07:30    


 


NT-Pro-B Natriuret Pep  746 pg/mL (<450)  H  08/04/20  20:35    


 


Total Protein  6.2 g/dL (6.3-8.2)  L  08/06/20  07:30    


 


Albumin  3.3 g/dL (3.5-5.0)  L  08/06/20  07:30    


 


Triglycerides  311 mg/dL (<150)  H  08/05/20  07:30    


 


Cholesterol  97.76 mg/dL (0-200)   08/05/20  07:30    


 


LDL Cholesterol Direct  < 30 mg/dL (<100)   08/05/20  07:30    


 


VLDL Cholesterol  62.2 mg/dL (10-31)  H  08/05/20  07:30    


 


HDL Cholesterol  8 mg/dL (>40)  L  08/05/20  07:30    


 


EGFR   Cancelled   08/04/20  20:35    


 


TSH  2.49 uIU/mL (0.47-4.68)   08/05/20  07:30    


 


Free T3 pg/mL  2.42 pg/mL (2.77-5.27)  L  08/05/20  07:30    


 


Urine Color  YELLOW   08/04/20  20:35    


 


Urine Appearance  SLIGHTLY-CLOUDY   08/04/20  20:35    


 


Urine pH  5.0  (5.0-9.0)   08/04/20  20:35    


 


Ur Specific Gravity  1.021   08/04/20  20:35    


 


Urine Protein  100 mg/dL (NEGATIVE)  H  08/04/20  20:35    


 


Urine Glucose (UA)  NEGATIVE mg/dL (NEGATIVE)   08/04/20  20:35    


 


Urine Ketones  NEGATIVE mg/dL (NEGATIVE)   08/04/20  20:35    


 


Urine Blood  NEGATIVE  (NEGATIVE)   08/04/20  20:35    


 


Urine Nitrite  NEGATIVE  (NEGATIVE)   08/04/20  20:35    


 


Urine Bilirubin  NEGATIVE  (NEGATIVE)   08/04/20  20:35    


 


Urine Urobilinogen  NEGATIVE mg/dL (<2.0)   08/04/20  20:35    


 


Ur Leukocyte Esterase  NEGATIVE  (NEGATIVE)   08/04/20  20:35    


 


Urine WBC (Auto)  4 /HPF  08/04/20  20:35    


 


Urine RBC (Auto)  6 /HPF  08/04/20  20:35    


 


U Hyaline Cast (Auto)  1 /LPF  08/04/20  20:35    


 


Squamous Epi Cells Auto  <1 /HPF  08/04/20  20:35    


 


Urine Mucus (Auto)  MANY /LPF  08/04/20  20:35    


 


Urine Ascorbic Acid  40  (NEGATIVE)  H  08/04/20  20:35    


 


Slides for Path Review  Cancelled   08/04/20  20:35    








                                        











  08/04/20 08/05/20 08/05/20





  20:35 01:12 07:30


 


CK-MB (CK-2)  1.86   1.67


 


Troponin I  0.039  0.045  0.055


 


NT-Pro-B Natriuret Pep  746 H  














  08/05/20 08/05/20 08/05/20





  13:53 18:39 19:28


 


CK-MB (CK-2)  1.12   1.08


 


Troponin I  0.057  0.058  0.059


 


NT-Pro-B Natriuret Pep   














  08/06/20 08/06/20





  01:27 07:30


 


CK-MB (CK-2)  


 


Troponin I  0.054  0.037


 


NT-Pro-B Natriuret Pep  











Impressions: 


                                        





Chest X-Ray  08/04/20 19:20


IMPRESSION: 


 


No acute abnormality is identified.


 


 














Plan


Plan of Treatment: 


Follow-up with primary care provider within 1 week of discharge.  Return to the 

ED for fever greater than 101





Stroke


Is this a Stroke Patient?: No





Acute Heart Failure





- **


Is this a Heart Failure Patient?: No

## 2020-08-06 NOTE — DRAGON STRESS TEST REPORT
Name: Edgardo Cope

: 46

Date: 20



The patient underwent treadmill exercise using the Modified Garry protocol, 
completing 10:01 minutes and completing an estimated workload of 7.0 metabolic 
equivalents (METS). The test was terminated due to lower extremity fatigue.



The heart rate was 68 beats per minute at baseline and increased to 96 beats at 
peak exercise, which was 67% of the maximum predicted heart rate. The rest blood
pressure was 159/89 mm/Hg and increased to 175/80 mm/Hg, which is a normal 
response. The patient complained of no symptoms during the procedure.



The resting electrocardiogram demonstrated NSR and did not show ST-segment 
changes consistent with ischemia.



Impression

-Submaximal exercise stress test using the Modified Garry protocol.

-Electrically and symptomatically negative for ischemia at the achieved 
workload.

-Resting hypertension.

-Mildly decreased exercise tolerance.

MTDD

## 2020-08-06 NOTE — PDOC PROGRESS REPORT
Subjective


Progress Note for:: 08/06/20


Subjective:: 


LUCIANO JACKSON is a 79 year old male with history of hypertension, 

hypothyroidism, hyperlipidemia, remote smoker who quit 60 years ago and without 

family history of premature coronary artery disease who is consulted to our 

service for evaluation of detectable troponins.  The patient presented to our 

emergency room yesterday with a one-week history of generalized weakness with a 

gradual onset and progressive worsening.  This morning he is found resting in 

bed comfortably and without any symptoms.  He specifically denies chest pain, 

shortness of breath, [place], PND, lower extremity edema, palpitations, syncope and 

presyncope.  He is physically active at home and walks on his treadmill at a 

regular pace for 20 to 30 minutes daily without any ischemic or heart failure 

symptoms.  He is also able to clean his house and work in his yard without 

angina, angina equivalents or heart failure symptoms.





08/06/20:


The patient had an uneventful night and continues to be asymptomatic from the 

cardiac standpoint. His troponin although detectable, it is in the indeterminate

range and flat.





Physical exam on 08/06/2020:


GENERAL:  Pleasant and conversational.  Oriented x3 with normal mood.  Not in 

acute distress.  Well groomed and well developed.


HEENT:  Normocephalic, atraumatic.  Pupils equal.   Sclerae anicteric.  

Oropharynx moist. 


NECK:  No JVD.  No carotid bruits. 


LUNGS:  Clear to auscultation bilaterally.  Normal respiratory effort without 

the use of accessory muscles or intercostal retractions.  


CARDIOVASCULAR:  Regular rate and rhythm, normal S1 and S2 without murmurs, 

rubs, or gallops.  PMI not displaced.


ABDOMEN: No masses or tenderness to palpation.  No bruit.  No splenomegaly or 

hepatomegaly. No abdominal aorta bruit noted.


EXTREMITIES:  No edema, no cyanosis, no clubbing.  +2 pulses femoral and pedal 

pulses bilaterally.  


SKIN: No lesions or rashes.


MUSCULOSKELETAL:  No chest tenderness to palpation. 


NEUROLOGIC: Nonfocal.  No gross sensory or motor deficits bilateral upper or 

lower extremities.





Reason For Visit: 


ELEVATED TROPONIN,ELEVATED BNP,GENERALIZED WEAKNES








Physical Exam


Vital Signs: 


                                        











Temp Pulse Resp BP Pulse Ox


 


 98.5 F   52 L  18   121/61   96 


 


 08/05/20 23:56  08/06/20 02:00  08/05/20 23:51  08/05/20 23:51  08/05/20 23:51








                                 Intake & Output











 08/05/20 08/06/20 08/07/20





 06:59 06:59 06:59


 


Intake Total 1240 2150 


 


Output Total  150 


 


Balance 1240 2000 


 


Weight 72.4 kg 72.4 kg 














Results


Laboratory Results: 


                                        





                                 08/05/20 07:30 





                                 08/05/20 07:30 





                                        











  08/05/20 08/05/20 08/05/20





  07:30 07:30 07:30


 


WBC  5.1  


 


RBC  4.35  


 


Hgb  13.6  


 


Hct  38.9  


 


MCV  89  


 


MCH  31.3  


 


MCHC  35.0  


 


RDW  13.9  


 


Plt Count  133 L  


 


Sodium   133.8 L 


 


Potassium   3.7 


 


Chloride   103 


 


Carbon Dioxide   24 


 


Anion Gap   7 


 


BUN   13 


 


Creatinine   0.64 


 


Est GFR ( Amer)   > 60 


 


Glucose   135 H 


 


Calcium   7.8 L 


 


Magnesium   1.8 


 


Total Bilirubin   0.4 


 


AST   75 H 


 


Alkaline Phosphatase   56 


 


Total Protein   5.3 L 


 


Albumin   2.6 L 


 


Triglycerides   311 H 


 


Cholesterol   97.76 


 


LDL Cholesterol Direct   < 30 


 


VLDL Cholesterol   62.2 H 


 


HDL Cholesterol   8 L 


 


TSH    2.49


 


Free T3 pg/mL    2.42 L








                                        











  08/04/20 08/04/20 08/04/20





  20:35 20:35 22:33


 


Creatine Kinase  Cancelled   249 H


 


CK-MB (CK-2)   1.86 


 


Troponin I   0.039 


 


NT-Pro-B Natriuret Pep   746 H 














  08/05/20 08/05/20 08/05/20





  01:12 07:30 07:30


 


Creatine Kinase   Cancelled 


 


CK-MB (CK-2)    1.67


 


Troponin I  0.045   0.055


 


NT-Pro-B Natriuret Pep   














  08/05/20 08/05/20 08/05/20





  07:30 13:53 13:53


 


Creatine Kinase  168  143 


 


CK-MB (CK-2)    1.12


 


Troponin I    0.057


 


NT-Pro-B Natriuret Pep   














  08/05/20 08/05/20 08/05/20





  18:39 19:28 19:28


 


Creatine Kinase   139 


 


CK-MB (CK-2)    1.08


 


Troponin I  0.058   0.059


 


NT-Pro-B Natriuret Pep   














  08/06/20





  01:27


 


Creatine Kinase 


 


CK-MB (CK-2) 


 


Troponin I  0.054


 


NT-Pro-B Natriuret Pep 











Impressions: 


                                        





Chest X-Ray  08/04/20 19:20


IMPRESSION: 


 


No acute abnormality is identified.


 


 








                                        





                                 08/05/20 07:30 





                                 08/05/20 07:30 





                                        











MCV  89 fl (80-97)   08/05/20  07:30    


 


MCH  31.3 pg (27.0-33.4)   08/05/20  07:30    


 


MCHC  35.0 g/dL (32.0-36.0)   08/05/20  07:30    


 


RDW  13.9 % (11.5-14.0)   08/05/20  07:30    


 


Seg Neutrophils %  80.0 % (42-78)  H  08/04/20  22:33    


 


Chloride  103 mmol/L ()   08/05/20  07:30    


 


Carbon Dioxide  24 mmol/L (22-30)   08/05/20  07:30    


 


Anion Gap  7  (5-19)   08/05/20  07:30    


 


Est GFR ( Amer)  > 60  (>60)   08/05/20  07:30    


 


Est GFR (Non-Af Amer)  Cancelled   08/04/20  20:35    


 


Glucose  135 mg/dL ()  H  08/05/20  07:30    


 


Calcium  7.8 mg/dL (8.4-10.2)  L  08/05/20  07:30    


 


Magnesium  1.8 mg/dL (1.6-2.3)   08/05/20  07:30    


 


Total Bilirubin  0.4 mg/dL (0.2-1.3)   08/05/20  07:30    


 


AST  75 U/L (17-59)  H  08/05/20  07:30    


 


Alkaline Phosphatase  56 U/L ()   08/05/20  07:30    


 


Total Protein  5.3 g/dL (6.3-8.2)  L  08/05/20  07:30    


 


Albumin  2.6 g/dL (3.5-5.0)  L  08/05/20  07:30    


 


Triglycerides  311 mg/dL (<150)  H  08/05/20  07:30    


 


Cholesterol  97.76 mg/dL (0-200)   08/05/20  07:30    


 


LDL Cholesterol Direct  < 30 mg/dL (<100)   08/05/20  07:30    


 


VLDL Cholesterol  62.2 mg/dL (10-31)  H  08/05/20  07:30    


 


HDL Cholesterol  8 mg/dL (>40)  L  08/05/20  07:30    


 


TSH  2.49 uIU/mL (0.47-4.68)   08/05/20  07:30    


 


Free T3 pg/mL  2.42 pg/mL (2.77-5.27)  L  08/05/20  07:30    


 


Urine Color  YELLOW   08/04/20  20:35    


 


Urine Appearance  SLIGHTLY-CLOUDY   08/04/20  20:35    


 


Urine pH  5.0  (5.0-9.0)   08/04/20  20:35    


 


Ur Specific Gravity  1.021   08/04/20  20:35    


 


Urine Protein  100 mg/dL (NEGATIVE)  H  08/04/20  20:35    


 


Urine Glucose (UA)  NEGATIVE mg/dL (NEGATIVE)   08/04/20  20:35    


 


Urine Ketones  NEGATIVE mg/dL (NEGATIVE)   08/04/20  20:35    


 


Urine Blood  NEGATIVE  (NEGATIVE)   08/04/20  20:35    


 


Urine Nitrite  NEGATIVE  (NEGATIVE)   08/04/20  20:35    


 


Ur Leukocyte Esterase  NEGATIVE  (NEGATIVE)   08/04/20  20:35    


 


Urine WBC (Auto)  4 /HPF  08/04/20  20:35    


 


Urine RBC (Auto)  6 /HPF  08/04/20  20:35    








                                        











  08/04/20 08/04/20 08/04/20





  20:35 20:35 22:33


 


Creatine Kinase  Cancelled   249 H


 


CK-MB (CK-2)   1.86 


 


Troponin I   0.039 


 


NT-Pro-B Natriuret Pep   746 H 














  08/05/20 08/05/20 08/05/20





  01:12 07:30 07:30


 


Creatine Kinase   Cancelled 


 


CK-MB (CK-2)    1.67


 


Troponin I  0.045   0.055


 


NT-Pro-B Natriuret Pep   














  08/05/20 08/05/20 08/05/20





  07:30 13:53 13:53


 


Creatine Kinase  168  143 


 


CK-MB (CK-2)    1.12


 


Troponin I    0.057


 


NT-Pro-B Natriuret Pep   














  08/05/20 08/05/20 08/05/20





  18:39 19:28 19:28


 


Creatine Kinase   139 


 


CK-MB (CK-2)    1.08


 


Troponin I  0.058   0.059


 


NT-Pro-B Natriuret Pep   














  08/06/20





  01:27


 


Creatine Kinase 


 


CK-MB (CK-2) 


 


Troponin I  0.054


 


NT-Pro-B Natriuret Pep 








                             Current Medication List











Generic Name Dose Route Start Last Admin





  Trade Name Freq  PRN Reason Stop Dose Admin


 


Acetaminophen  650 mg  08/05/20 03:20  08/05/20 21:56





  Tylenol 325 Mg Tablet  PO  09/04/20 03:19  650 mg





  Q4HP PRN   Administration





  For headache, pain or fever  


 


Al Hydrox/Mg Hydrox/Simethicone  30 ml  08/05/20 03:20 





  Maalox Plus Susp 30 Udcup  PO  09/04/20 03:19 





  Q6HP PRN  





  HEARTBURN  


 


Ascorbic Acid  500 mg  08/06/20 10:00 





  Vitamin C 500 Mg Tablet  PO  09/05/20 09:59 





  DAILY [facility]  


 


Cholecalciferol  1,000 unit  08/06/20 10:00 





  Vitamin D3 1000 Unit Tablet  PO  09/05/20 09:59 





  DAILY [facility]  


 


Docusate Sodium  100 mg  08/05/20 10:00  08/05/20 17:09





  Colace 100 Mg Capsule  PO  09/04/20 09:59  Not Given





  BID [facility]  


 


Famotidine  20 mg  08/05/20 10:00  08/05/20 21:58





  Pepcid 20 Mg Tablet  PO  09/04/20 09:59  Not Given





  Q12 [facility]  


 


Guaifenesin  200 mg  08/05/20 03:20 





  Robitussin Syrup 200 Mg/10 Ml Ud Cup  PO  09/04/20 03:19 





  Q4HP PRN  





  COUGH  


 


Heparin Sodium (Porcine)  5,000 unit  08/05/20 06:00  08/06/20 06:09





  Heparin Inj 5,000 Units/Ml 1 Ml Vial  SUBCUT  09/04/20 05:59  Not Given





  Q8 [facility]  


 


Levalbuterol HCl  0.63 mg  08/05/20 03:26 





  Xopenex Neb 0.63 Mg/3 Ml Ampul  NEB  09/04/20 03:25 





  RTQ2HP PRN  





  SHORTNESS OF BREATH  


 


Levothyroxine Sodium  0.025 mg  08/06/20 10:00 





  Synthroid 0.025 Mg Tablet  PO  09/05/20 09:59 





  DAILY [facility]  


 


Lorazepam  1 mg  08/05/20 03:20 





  Ativan Inj 2 Mg/1 Ml Vial  IV  08/12/20 03:19 





  Q4HP PRN  





  ANXIETY/AGITATION  


 


Magnesium Hydroxide  30 ml  08/05/20 03:20 





  Milk Of Magnesia 30 Ml Udcup  PO  09/04/20 03:19 





  HSP PRN  





  FOR CONSTIPATION  


 


Melatonin  5 mg  08/05/20 03:20 





  Melatonin 5 Mg Tablet  PO  09/04/20 03:19 





  HSP PRN  





  SLEEP OR INSOMNIA  


 


Metoprolol Succinate  100 mg  08/05/20 11:00  08/05/20 10:32





  Toprol Xl 50 Mg Tab.Sr  PO  09/04/20 10:59  100 mg





  DAILY [facility]   Administration


 


Metoprolol Tartrate  5 mg  08/05/20 05:18 





  Lopressor Inj/Pf 5 Mg/5 Ml Sdv  IV  09/04/20 05:17 





  Q4HP PRN  





  Give For Sbp > 160 / Dbp > 100  


 


Morphine Sulfate  2 mg  08/05/20 03:50 





  Morphine 10 Mg/Ml Inj  IV  08/12/20 03:49 





  Q2HP PRN  





  PAIN SCALE OF 2  


 


Morphine Sulfate  3 mg  08/05/20 03:51 





  Morphine 10 Mg/Ml Inj  IV  08/12/20 03:50 





  Q2HP PRN  





  FOR PAIN SCALE 3-4  


 


Morphine Sulfate  4 mg  08/05/20 03:51 





  Morphine 10 Mg/Ml Inj  IV  08/12/20 03:50 





  Q2HP PRN  





  PAIN SCALE OF 5  


 


Multivitamins  1 tab  08/06/20 10:00 





  Tab-A-Pia (Multiple Vitamin) Tablet  PO  09/05/20 09:59 





  DAILY [facility]  


 


Ondansetron HCl  4 mg  08/05/20 03:26 





  Zofran Inj/Pf 4 Mg/2 Ml Sdv  IV  09/04/20 03:25 





  Q4HP PRN  





  FOR NAUSEA/VOMITING  


 


Simvastatin  40 mg  08/06/20 10:00 





  Zocor 40 Mg Tablet  PO  09/05/20 09:59 





  DAILY [facility]  


 


Sodium Chloride  2.5 ml  08/05/20 06:00  08/06/20 06:10





  Saline Flush 2.5 Ml Monoject Prefil Syrin  IV  09/04/20 05:59  Not Given





  Q8 LD  














Discontinued Medications














Generic Name Dose Route Start Last Admin





  Trade Name Freq  PRN Reason Stop Dose Admin


 


Famotidine  20 mg  08/05/20 04:00  08/05/20 06:32





  Pepcid 20 Mg Tablet  PO  08/05/20 04:01  Not Given





  NOW ONE  


 


Famotidine  Confirm  08/05/20 06:31  08/05/20 06:43





  Pepcid 20 Mg Tablet  Administered  08/05/20 06:32  Not Given





  Dose  





  20 mg  





  .ROUTE  





  .STK-MED ONE  


 


Sodium Chloride  1,000 mls @ 0 mls/hr  08/04/20 19:20  08/04/20 21:51





  Nacl 0.9% 1000 Ml Iv Soln  IV  08/04/20 19:21  Infused





  BOLUS ONE   Infusion





  [facility]  


 


Dextrose/Lactated Ringer's  1,000 mls @ 250 mls/hr  08/05/20 05:17  08/05/20 

11:30





  D5lr 1000 Ml Iv Soln  IV   Infused





  CONTINUOUS PRN   Infusion





  THIS MED IS NOT "PRN"  


 


Morphine Sulfate  2 - 4 mg  08/05/20 03:20 





  Morphine 10 Mg/Ml Inj  IV  08/12/20 03:19 





  Q2HP PRN  





  See protocol  





  Protocol  














Assessment & Plan





- Diagnosis


(1) Troponin I above reference range


Plan: 


79-year-old male with cardiac risk factors of age, gender, hypertension and 

hyperlipidemia who was admitted for evaluation of generalized weakness and who 

has remained asymptomatic since admission.  His cardiac troponins are detectable

however in the indeterminate range and flat.  Of note, the patient exercises on 

his treadmill for up to 30 minutes every day at a regular pace without any 

angina, anginal equivalents or heart failure symptoms.  His echocardiogram 

demonstrated a normal LVSF without regional wall motion abnormalities among 

other findings.  His exercise stress test today was submaximal and the test was 

stopped due to lower extremity tiredness however there was no ischemic changes 

or symptoms at the achieved workload.





Recommendations:


-Continue with primary prevention measures.


-No class I indication for further ischemic work-up at this point.


-Cardiology will sign off the case for now.

## 2022-06-24 NOTE — ER DOCUMENT REPORT
ED General





- General


Mode of Arrival: Wheelchair


Information source: Patient


TRAVEL OUTSIDE OF THE [place] IN LAST 30 DAYS: No





- HPI


Onset: Last week


Onset/Duration: Sudden


Quality of pain: No pain


Severity: None


Pain Level: Denies


Associated symptoms: None, Weakness


Exacerbated by: Denies


Relieved by: Denies


Similar symptoms previously: Yes


Recently seen / treated by doctor: Yes





<IRA LAUREN T - Last Filed: 08/05/20 00:10>





<NONA SHI - Last Filed: 08/05/20 02:43>





- General


Chief Complaint: General Weakness


Stated Complaint: FEVER


Time Seen by Provider: 08/04/20 18:37


Primary Care Provider: 


CALI LEIVA MD [Primary Care Provider] - Follow up as needed


Notes: 





Patient is a 79-year-old male who returns to the emergency room with a complaint

of spiking a fever today at 2 PM at home.  Originally I saw patient here in the 

emergency room on August 2, 2020 with the same kind of complaint.  At that time 

he had also complained of being dizzy for the past 3 weeks states that his 

primary care Dr. Cali Leiva at told him that was probably his blood pressure 

medications and they were going to work on it.  Although patient when he got he

re to the emergency room did not complain of having a fever but had a oral temp 

here in the emergency room of 102.0.  I was working in pod 5 and received 

patient because he had COVID symptoms.  I did an extensive work-up with patient 

and his chest x-ray was negative his urine was negative there was no source for 

a fever.  I did culture him prior to letting him go home but informed him he 

needed to self isolate.  Patient understood and decided to go home.  I did 

inform him should he have any concerns or problems he is to return to ER for 

reevaluation.  So that is what he said today he did he came back because he 

spiked a temp to 102.0 at home once again.  Prior to patient leaving the 

hospital 2 days ago because of the he had a 15,000 white count and no 

association I could find and thinking that he probably had been exposed to 

coronavirus I went ahead and placed him on a Z-Pa.  Patient states he has been 

feeling better until the spike of the fever.  He also says that he has not done 

much while being home except lay around and be tired.  His only medical history 

is of hypertension and hypothyroidism.  He denies any history of cardiac or 

respiratory problems. (IRA LAUREN)





- Related Data


Allergies/Adverse Reactions: 


                                        





No Known Allergies Allergy (Verified 08/02/20 12:59)


   











Past Medical History





- General


Information source: Patient





- Social History


Smoking Status: Former Smoker


Cigarette use (# per day): No


Chew tobacco use (# tins/day): No


Smoking Education Provided: No


Frequency of alcohol use: None


Drug Abuse: None


Lives with: Friend


Family History: Reviewed & Not Pertinent


Patient has homicidal ideation: No





- Past Medical History


Cardiac Medical History: Reports: Hx Hypercholesterolemia, Hx Hypertension





<IRA LAUREN - Last Filed: 08/05/20 00:10>





Review of Systems





- Review of Systems


Constitutional: Fever, Malaise, Weakness


EENT: No symptoms reported


Cardiovascular: No symptoms reported


Respiratory: No symptoms reported


Gastrointestinal: No symptoms reported


Genitourinary: No symptoms reported


Male Genitourinary: No symptoms reported


Musculoskeletal: No symptoms reported


Skin: No symptoms reported


Hematologic/Lymphatic: No symptoms reported


Neurological/Psychological: No symptoms reported


-: Yes All other systems reviewed and negative





<IRA LAUREN - Last Filed: 08/05/20 00:10>





Physical Exam





- Vital signs


Interpretation: Hypotensive





<IRA LAUREN - Last Filed: 08/05/20 00:10>





- Vital signs


Vitals: 


                                        











Temp Pulse Resp BP Pulse Ox


 


 99.0 F   81   18   104/59 L  97 


 


 08/04/20 16:05  08/04/20 16:05  08/04/20 16:05  08/04/20 16:05  08/04/20 16:05














- Notes


Notes: 





PHYSICAL EXAMINATION:





GENERAL: Patient is a thin appearing 71-year-old male who is in no distress on 

examination.





HEAD: Atraumatic, normocephalic.





EYES: Pupils equal round and reactive to light, extraocular movements intact, 

sclera anicteric, conjunctiva are normal.





ENT: Nares patent, oropharynx clear without exudates.  Moist mucous membranes.





NECK: Normal range of motion, supple without lymphadenopathy





LUNGS: Auscultation patient's lungs shows bilateral breath sounds which are 

increased throughout no rhonchi rales or wheeze are heard.





HEART: Regular rate and rhythm without murmurs





ABDOMEN: Soft, nontender, nondistended abdomen.  No guarding, no rebound.  No 

masses appreciated.





Musculoskeletal: Normal range of motion, no pitting or edema.  No cyanosis.





NEUROLOGICAL:   Normal speech, normal gait.  Normal sensory, motor exams 





PSYCH: Normal mood, normal affect.





SKIN: Warm, Dry, normal turgor, no rashes or lesions noted. (IRA LAUREN)





Course





- Laboratory


Result Diagrams: 


                                 08/04/20 22:33





                                 08/04/20 22:33





<IRA LAUREN - Last Filed: 08/05/20 00:10>





- Laboratory


Result Diagrams: 


                                 08/04/20 22:33





                                 08/04/20 22:33





- Diagnostic Test


Radiology reviewed: Image reviewed, Reports reviewed





<NONA SHI - Last Filed: 08/05/20 02:43>





- Re-evaluation


Re-evalutation: 





08/05/20 00:17


This time patient has slight change in his findings.  First his chest x-ray was 

normal there was no acute findings.  Patient's EKG did not show any major 

changes as well either sinus rhythm was seen on both.  But patient's troponin c

marlyn back bumped slightly to 0.039 with a CK of 249.  I discussed the case with 

Dr. Shi and he looked over the entire chart and did feel that since patient 

is continuing with the same complaint of fatigue and that he is had a slight 

bump in his enzymes that probably warrant talking to the hospitalist about 

admission and rule out.  Patient is still having no chest pain or shortness of 

breath today. (IRA LAUREN)





08/05/20 02:29


patient's 2nd troponin uptrending although only slightly


I am unsure as to why the patient's cardiac profile is abnormal and to why he is

running fever w/ significant fatigue


I have spoke with Dr Weiland regarding admission given ongoing lab abnormalities

 (NONA SHI)





- Vital Signs


Vital signs: 


                                        











Temp Pulse Resp BP Pulse Ox


 


 98.2 F   69   17   151/80 H  97 


 


 08/04/20 23:12  08/04/20 23:12  08/04/20 23:12  08/04/20 23:12  08/04/20 23:12














- Laboratory


Laboratory results interpreted by me: 


                                        











  08/04/20 08/04/20 08/04/20





  20:35 20:35 22:33


 


Plt Count    128 L


 


Lymph % (Auto)    9.6 L


 


Seg Neutrophils %    80.0 H


 


Sodium   


 


AST   


 


ALT   


 


Creatine Kinase   


 


NT-Pro-B Natriuret Pep  746 H  


 


Total Protein   


 


Albumin   


 


Urine Protein   100 H 


 


Urine Ascorbic Acid   40 H 














  08/04/20





  22:33


 


Plt Count 


 


Lymph % (Auto) 


 


Seg Neutrophils % 


 


Sodium  135.6 L


 


AST  90 H


 


ALT  71 H


 


Creatine Kinase  249 H


 


NT-Pro-B Natriuret Pep 


 


Total Protein  6.1 L


 


Albumin  3.3 L


 


Urine Protein 


 


Urine Ascorbic Acid 














- EKG Interpretation by Me


Additional EKG results interpreted by me: 





08/05/20 02:30


NSR, rate of 70, no ST segment changes, no T wave abnormalities (NONA SHI)





Discharge





<IRA LAUREN - Last Filed: 08/05/20 00:10>





- Discharge


Admitting Provider: Weiland (Hospitalist)


Unit Admitted: Medical Floor





<NONA SHI - Last Filed: 08/05/20 02:43>





- Discharge


Clinical Impression: 


 Weakness, Elevated troponin





Fever


Qualifiers:


 Fever type: unspecified Qualified Code(s): R50.9 - Fever, unspecified





Condition: Stable


Disposition: ADMITTED AS OBSERVATION


Referrals: 


CALI LEIVA MD [Primary Care Provider] - Follow up as needed
I personally performed the service described in the documentation recorded by the scribe in my presence, and it accurately and completely records my words and actions.